# Patient Record
Sex: MALE | Race: WHITE | NOT HISPANIC OR LATINO | Employment: FULL TIME | URBAN - METROPOLITAN AREA
[De-identification: names, ages, dates, MRNs, and addresses within clinical notes are randomized per-mention and may not be internally consistent; named-entity substitution may affect disease eponyms.]

---

## 2018-01-04 ENCOUNTER — OFFICE VISIT (OUTPATIENT)
Dept: FAMILY MEDICINE | Facility: CLINIC | Age: 28
End: 2018-01-04
Payer: COMMERCIAL

## 2018-01-04 VITALS
HEART RATE: 77 BPM | TEMPERATURE: 98 F | HEIGHT: 73 IN | WEIGHT: 186.2 LBS | BODY MASS INDEX: 24.68 KG/M2 | OXYGEN SATURATION: 97 % | SYSTOLIC BLOOD PRESSURE: 114 MMHG | DIASTOLIC BLOOD PRESSURE: 74 MMHG | RESPIRATION RATE: 16 BRPM

## 2018-01-04 DIAGNOSIS — F41.0 PANIC ATTACK: ICD-10-CM

## 2018-01-04 DIAGNOSIS — Z23 NEED FOR VACCINATION: ICD-10-CM

## 2018-01-04 DIAGNOSIS — Z23 NEED FOR PROPHYLACTIC VACCINATION AND INOCULATION AGAINST INFLUENZA: ICD-10-CM

## 2018-01-04 DIAGNOSIS — F41.1 GAD (GENERALIZED ANXIETY DISORDER): Primary | ICD-10-CM

## 2018-01-04 LAB
HGB BLD-MCNC: 16 G/DL (ref 13.3–17.7)
TSH SERPL DL<=0.005 MIU/L-ACNC: 1.63 MU/L (ref 0.4–4)

## 2018-01-04 PROCEDURE — 99203 OFFICE O/P NEW LOW 30 MIN: CPT | Mod: 25 | Performed by: FAMILY MEDICINE

## 2018-01-04 PROCEDURE — 90472 IMMUNIZATION ADMIN EACH ADD: CPT | Performed by: FAMILY MEDICINE

## 2018-01-04 PROCEDURE — 84443 ASSAY THYROID STIM HORMONE: CPT | Performed by: FAMILY MEDICINE

## 2018-01-04 PROCEDURE — 90632 HEPA VACCINE ADULT IM: CPT | Performed by: FAMILY MEDICINE

## 2018-01-04 PROCEDURE — 90715 TDAP VACCINE 7 YRS/> IM: CPT | Performed by: FAMILY MEDICINE

## 2018-01-04 PROCEDURE — 36415 COLL VENOUS BLD VENIPUNCTURE: CPT | Performed by: FAMILY MEDICINE

## 2018-01-04 PROCEDURE — 90686 IIV4 VACC NO PRSV 0.5 ML IM: CPT | Performed by: FAMILY MEDICINE

## 2018-01-04 PROCEDURE — 90471 IMMUNIZATION ADMIN: CPT | Performed by: FAMILY MEDICINE

## 2018-01-04 PROCEDURE — 85018 HEMOGLOBIN: CPT | Performed by: FAMILY MEDICINE

## 2018-01-04 RX ORDER — HYDROXYZINE HYDROCHLORIDE 25 MG/1
25-50 TABLET, FILM COATED ORAL EVERY 6 HOURS PRN
Qty: 60 TABLET | Refills: 1 | Status: SHIPPED | OUTPATIENT
Start: 2018-01-04 | End: 2018-02-05

## 2018-01-04 ASSESSMENT — ANXIETY QUESTIONNAIRES
GAD7 TOTAL SCORE: 17
IF YOU CHECKED OFF ANY PROBLEMS ON THIS QUESTIONNAIRE, HOW DIFFICULT HAVE THESE PROBLEMS MADE IT FOR YOU TO DO YOUR WORK, TAKE CARE OF THINGS AT HOME, OR GET ALONG WITH OTHER PEOPLE: SOMEWHAT DIFFICULT
5. BEING SO RESTLESS THAT IT IS HARD TO SIT STILL: NEARLY EVERY DAY
7. FEELING AFRAID AS IF SOMETHING AWFUL MIGHT HAPPEN: MORE THAN HALF THE DAYS
6. BECOMING EASILY ANNOYED OR IRRITABLE: NEARLY EVERY DAY
1. FEELING NERVOUS, ANXIOUS, OR ON EDGE: MORE THAN HALF THE DAYS
2. NOT BEING ABLE TO STOP OR CONTROL WORRYING: NEARLY EVERY DAY
3. WORRYING TOO MUCH ABOUT DIFFERENT THINGS: NEARLY EVERY DAY

## 2018-01-04 ASSESSMENT — PAIN SCALES - GENERAL: PAINLEVEL: NO PAIN (0)

## 2018-01-04 ASSESSMENT — PATIENT HEALTH QUESTIONNAIRE - PHQ9: 5. POOR APPETITE OR OVEREATING: SEVERAL DAYS

## 2018-01-04 NOTE — MR AVS SNAPSHOT
After Visit Summary   1/4/2018    Burton Pillai    MRN: [id number]           Patient Information     Date Of Birth          1990        Visit Information        Provider Department      1/4/2018 2:45 PM Marky Owens MD [facility]        Today's Diagnoses     ELSA (generalized anxiety disorder)    -  1    Panic attack        Need for prophylactic vaccination and inoculation against influenza        Need for vaccination           Follow-ups after your visit        Additional Services     MENTAL HEALTH REFERRAL  - Adult; Outpatient Treatment; Individual/Couples/Family/Group Therapy/Health Psychology; G: [facility] [phone]; We will contact you to schedule the appointment or please call with any questions       All scheduling is subject to the client's specific insurance plan & benefits, provider/location availability, and provider clinical specialities.  Please arrive 15 minutes early for your first appointment and bring your completed paperwork.    Please be aware that coverage of these services is subject to the terms and limitations of your health insurance plan.  Call member services at your health plan with any benefit or coverage questions.                            Your next 10 appointments already scheduled     Feb 05, 2018  2:15 PM CST   Office Visit with Marky Owens MD   [facility] ([facility])    [address] [place] [id number] [phone]           Bring a current list of meds and any records pertaining to this visit. For Physicals, please bring immunization records and any forms needing to be filled out. Please arrive 10 minutes early to complete paperwork.              Who to contact     If you have questions or need follow up information about today's clinic visit or your schedule please contact [facility] directly at [phone].  Normal or non-critical  "lab and imaging results will be communicated to you by SwitchForcehart, letter or phone within 4 business days after the clinic has received the results. If you do not hear from us within 7 days, please contact the clinic through Avenue [employer] or phone. If you have a critical or abnormal lab result, we will notify you by phone as soon as possible.  Submit refill requests through [employer] or call your pharmacy and they will forward the refill request to us. Please allow 3 business days for your refill to be completed.          Additional Information About Your Visit        SwitchForceharSpotzot Information     [employer] gives you secure access to your electronic health record. If you see a primary care provider, you can also send messages to your care team and make appointments. If you have questions, please call your primary care clinic.  If you do not have a primary care provider, please call [phone] and they will assist you.        Care EveryWhere ID     This is your Care EveryWhere ID. This could be used by other organizations to access your [place] medical records  [id number]        Your Vitals Were     Pulse Temperature Respirations Height Pulse Oximetry BMI (Body Mass Index)    77 98  F (36.7  C) (Temporal) 16 1.847 m (6' 0.7\") 97% 24.77 kg/m2       Blood Pressure from Last 3 Encounters:   01/04/18 114/74   08/05/16 130/82   04/07/15 117/71    Weight from Last 3 Encounters:   01/04/18 84.5 kg (186 lb 3.2 oz)   08/05/16 80.7 kg (177 lb 14.4 oz)   03/01/14 70.3 kg (155 lb)              We Performed the Following     FLU VAC, SPLIT VIRUS IM > 3 YO (QUADRIVALENT) [75594]     Hemoglobin     HEPATITIS A VACCINE, ADULT  [00679]     MENTAL HEALTH REFERRAL  - Adult; Outpatient Treatment; Individual/Couples/Family/Group Therapy/Health Psychology; [facility] [phone]; We will contact you to schedule the appointment or please call with any questions     TDAP VACCINE (ADACEL) [76312.002]     TSH with free T4 reflex  "    Vaccine Administration, Each Additional [04316]     Vaccine Administration, Initial [84172]          Today's Medication Changes          These changes are accurate as of: 1/4/18  5:43 PM.  If you have any questions, ask your nurse or doctor.               Start taking these medicines.        Dose/Directions    hydrOXYzine 25 MG tablet   Commonly known as:  ATARAX   Used for:  ELSA (generalized anxiety disorder), Panic attack   Started by:  Marky Owens MD        Dose:  25-50 mg   Take 1-2 tablets (25-50 mg) by mouth every 6 hours as needed for anxiety   Quantity:  60 tablet   Refills:  1       sertraline 50 MG tablet   Commonly known as:  ZOLOFT   Used for:  ELSA (generalized anxiety disorder), Panic attack   Started by:  Marky Owens MD        Dose:  50 mg   Take 1 tablet (50 mg) by mouth daily   Quantity:  30 tablet   Refills:  1            Where to get your medicines      These medications were sent to [facility] [address]   [address] [facility] [address]     Phone:  [phone]     hydrOXYzine 25 MG tablet    sertraline 50 MG tablet                Primary Care Provider Office Phone # Fax #    Marky Owens [phone]       [address]   [facility] [address]        Equal Access to Services     [facility] AH: Hadii nabor ku hadasho Soomaali, waaxda luqadaha, qaybta kaalmada adeegyada, waxay idiin haytamin baron good. So [facility] [phone].    ATENCIÓN: Si habla español, tiene a mcguire disposición servicios gratuitos de asistencia lingüística. Llame al [phone].    We comply with applicable federal civil rights laws and [place] laws. We do not discriminate on the basis of race, color, national origin, age, disability, sex, sexual orientation, or gender identity.            Thank you!     Thank you for choosing [facility]  for your care. Our goal is always to provide you with excellent care.  Hearing back from our patients is one way we can continue to improve our services. Please take a few minutes to complete the written survey that you may receive in the mail after your visit with us. Thank you!             Your Updated Medication List - Protect others around you: Learn how to safely use, store and throw away your medicines at www.disposemymeds.org.          This list is accurate as of: 1/4/18  5:43 PM.  Always use your most recent med list.                   Brand Name Dispense Instructions for use Diagnosis    hydrOXYzine 25 MG tablet    ATARAX    60 tablet    Take 1-2 tablets (25-50 mg) by mouth every 6 hours as needed for anxiety    ELSA (generalized anxiety disorder), Panic attack       sertraline 50 MG tablet    ZOLOFT    30 tablet    Take 1 tablet (50 mg) by mouth daily    ELSA (generalized anxiety disorder), Panic attack

## 2018-01-04 NOTE — NURSING NOTE
Screening Questionnaire for Adult Immunization    Are you sick today?   No   Do you have allergies to medications, food, a vaccine component or latex?   No   Have you ever had a serious reaction after receiving a vaccination?   No   Do you have a long-term health problem with heart disease, lung disease, asthma, kidney disease, metabolic disease (e.g. diabetes), anemia, or other blood disorder?   No   Do you have cancer, leukemia, HIV/AIDS, or any other immune system problem?   No   In the past 3 months, have you taken medications that affect  your immune system, such as prednisone, other steroids, or anticancer drugs; drugs for the treatment of rheumatoid arthritis, Crohn s disease, or psoriasis; or have you had radiation treatments?   No   Have you had a seizure, or a brain or other nervous system problem?   No   During the past year, have you received a transfusion of blood or blood     products, or been given immune (gamma) globulin or antiviral drug?   No   For women: Are you pregnant or is there a chance you could become        pregnant during the next month?   No   Have you received any vaccinations in the past 4 weeks?   No     Immunization questionnaire answers were all negative.           Patient instructed to remain in clinic for 15 minutes afterwards, and to report any adverse reaction to me immediately.       Screening performed by Nikkie Chatterjee on 1/4/2018 at 3:57 PM.

## 2018-01-04 NOTE — PROGRESS NOTES
Burton,  Your results are normal.  Please let me know if you have any questions.    Sincerely,  Dr. Owens

## 2018-01-04 NOTE — PROGRESS NOTES
SUBJECTIVE:   Burton Pillai is a 27 year old male who presents to clinic today for the following health issues:        Abnormal Mood Symptoms  Onset: 2 years ago    Description:   Depression: no  Anxiety: YES    Accompanying Signs & Symptoms:  Still participating in activities that you used to enjoy: YES    Fatigue: YES  Irritability: YES  Difficulty concentrating: YES  Changes in appetite: no  Problems with sleep: no  Heart racing/beating fast : YES  Thoughts of hurting yourself or others: none    History:   Recent stress: YES  Prior depression hospitalization: None  Family history of depression: YES  Family history of anxiety: YES    Precipitating factors:   Alcohol/drug use: YES- little alcohol 1 to 2 drinks per week    Alleviating factors:  Nothing    ELSA-7 SCORE 1/4/2018   Total Score 17         Therapies Tried and outcome: None          Problem list and histories reviewed & adjusted, as indicated.  Additional history: as documented        Reviewed and updated as needed this visit by clinical staffTobacco  Allergies  Meds  Problems  Med Hx  Surg Hx  Fam Hx  Soc Hx        Reviewed and updated as needed this visit by Provider  Allergies  Meds  Problems         Patient here to discuss anxiety symptoms as noted above.  The past month or 2 he has been having full onset panic attacks almost daily.  No previous history of depression or anxiety for which he has been treated.  His fiancée has anxiety and is on Lexapro and she has recommended that he come in for evaluation as well.  He is otherwise healthy and on no medications.  He has had times where his panic attacks have prevented him from attending parties with friends.  He has had some symptoms at work but is able to continue to do his job.  He finds it difficult at times to leave the house.    Social History   Substance Use Topics     Smoking status: Passive Smoke Exposure - Never Smoker     Smokeless tobacco: Never Used      Comment: parents      "Alcohol use 3.0 oz/week     6 Cans of beer per week        ROS:  10 point ROS of systems including Constitutional, Eyes, HENT, Respiratory, Cardiovascular, Gastroenterology, Genitourinary, Integumentary, Muscularskeletal, Psychiatric were all negative except for pertinent positives noted in my HPI.     OBJECTIVE:   /74 (BP Location: Left arm, Patient Position: Chair, Cuff Size: Adult Large)  Pulse 77  Temp 98  F (36.7  C) (Temporal)  Resp 16  Ht 6' 0.7\" (1.847 m)  Wt 186 lb 3.2 oz (84.5 kg)  SpO2 97%  BMI 24.77 kg/m2  Body mass index is 24.77 kg/(m^2).  Physical Exam   Constitutional: He appears well-developed and well-nourished.   Neck: No thyroid mass and no thyromegaly present.   Cardiovascular: Normal rate, regular rhythm, S1 normal, S2 normal and normal heart sounds.    No murmur heard.  Pulmonary/Chest: Effort normal and breath sounds normal. No respiratory distress. He has no wheezes. He has no rhonchi. He has no rales.   Neurological: He is alert.   Psychiatric: His speech is normal and behavior is normal. Judgment and thought content normal. His mood appears anxious. His affect is not angry. Cognition and memory are normal. He exhibits a depressed mood (Flat affect).       ASSESSMENT/PLAN:       ICD-10-CM    1. ELSA (generalized anxiety disorder) F41.1 hydrOXYzine (ATARAX) 25 MG tablet     sertraline (ZOLOFT) 50 MG tablet     TSH with free T4 reflex     Hemoglobin   2. Panic attack F41.0 hydrOXYzine (ATARAX) 25 MG tablet     sertraline (ZOLOFT) 50 MG tablet     TSH with free T4 reflex     Hemoglobin   3. Need for prophylactic vaccination and inoculation against influenza Z23 FLU VAC, SPLIT VIRUS IM > 3 YO (QUADRIVALENT) [25170]     Vaccine Administration, Initial [87402]     Vaccine Administration, Each Additional [00525]     TDAP VACCINE (ADACEL) [65376.002]     HEPATITIS A VACCINE, ADULT  [07534]   4. Need for vaccination Z23      PLAN:  1.  We discussed treatment for anxiety both with " medications and with therapy.  Patient is interested in therapy so we will place order for [facility] [facility] here in [place].  I will start him on Zoloft 50 mg daily.  We discussed risks/benefits of the medication and he had no further questions.  If he notices any side effects with the Zoloft he can contact me via [employer] and we will adjust his dose.  He would like something for his daily panic attacks, so we will start him on hydroxyzine 25-50 mg every 6 hours as needed panic attacks.  I did warn him that this medication can make him drowsy.  2.  Will check TSH and hemoglobin for physical causes of anxiety.  3.  Vaccines as noted above.    Follow up with Provider -1 month for recheck on the Zoloft medication.    I spent > 35 minutes of face to face time with the patient, >50% of which was spent counseling and coordination of care regarding anxiety management.     Marky Owens MD   [facility]     Injectable Influenza Immunization Documentation    1.  Is the person to be vaccinated sick today?   No    2. Does the person to be vaccinated have an allergy to a component   of the vaccine?   No  Egg Allergy Algorithm Link    3. Has the person to be vaccinated ever had a serious reaction   to influenza vaccine in the past?   No    4. Has the person to be vaccinated ever had Guillain-Barré syndrome?   No    Form completed by Nikkie Chatterjee MA 1/4/2018  3:59 PM

## 2018-01-04 NOTE — NURSING NOTE
"Chief Complaint   Patient presents with     Anxiety       Initial /74 (BP Location: Left arm, Patient Position: Chair, Cuff Size: Adult Large)  Pulse 77  Temp 98  F (36.7  C) (Temporal)  Resp 16  Ht 6' 0.7\" (1.847 m)  Wt 186 lb 3.2 oz (84.5 kg)  SpO2 97%  BMI 24.77 kg/m2 Estimated body mass index is 24.77 kg/(m^2) as calculated from the following:    Height as of this encounter: 6' 0.7\" (1.847 m).    Weight as of this encounter: 186 lb 3.2 oz (84.5 kg).  Medication Reconciliation: complete     Nikkie Chatterjee MA 1/4/2018  2:52 PM          "

## 2018-01-04 NOTE — NURSING NOTE
Prior to injection verified patient identity using patient's name and date of birth.      Nikkie Chatterjee MA 1/4/2018  4:19 PM

## 2018-01-05 ASSESSMENT — ANXIETY QUESTIONNAIRES: GAD7 TOTAL SCORE: 17

## 2018-02-05 ENCOUNTER — OFFICE VISIT (OUTPATIENT)
Dept: FAMILY MEDICINE | Facility: CLINIC | Age: 28
End: 2018-02-05
Payer: COMMERCIAL

## 2018-02-05 VITALS
RESPIRATION RATE: 20 BRPM | TEMPERATURE: 98.3 F | BODY MASS INDEX: 25.33 KG/M2 | DIASTOLIC BLOOD PRESSURE: 64 MMHG | HEIGHT: 72 IN | WEIGHT: 187 LBS | SYSTOLIC BLOOD PRESSURE: 110 MMHG | HEART RATE: 78 BPM | OXYGEN SATURATION: 100 %

## 2018-02-05 DIAGNOSIS — F41.1 GAD (GENERALIZED ANXIETY DISORDER): ICD-10-CM

## 2018-02-05 DIAGNOSIS — F41.0 PANIC ATTACK: ICD-10-CM

## 2018-02-05 PROCEDURE — 99214 OFFICE O/P EST MOD 30 MIN: CPT | Performed by: FAMILY MEDICINE

## 2018-02-05 RX ORDER — HYDROXYZINE HYDROCHLORIDE 25 MG/1
25-50 TABLET, FILM COATED ORAL EVERY 6 HOURS PRN
Qty: 60 TABLET | Refills: 1 | Status: SHIPPED | OUTPATIENT
Start: 2018-02-05 | End: 2019-12-20

## 2018-02-05 RX ORDER — SERTRALINE HYDROCHLORIDE 100 MG/1
100 TABLET, FILM COATED ORAL DAILY
Qty: 30 TABLET | Refills: 1 | Status: SHIPPED | OUTPATIENT
Start: 2018-02-05 | End: 2019-12-20

## 2018-02-05 ASSESSMENT — ANXIETY QUESTIONNAIRES
5. BEING SO RESTLESS THAT IT IS HARD TO SIT STILL: NEARLY EVERY DAY
GAD7 TOTAL SCORE: 14
IF YOU CHECKED OFF ANY PROBLEMS ON THIS QUESTIONNAIRE, HOW DIFFICULT HAVE THESE PROBLEMS MADE IT FOR YOU TO DO YOUR WORK, TAKE CARE OF THINGS AT HOME, OR GET ALONG WITH OTHER PEOPLE: NOT DIFFICULT AT ALL
7. FEELING AFRAID AS IF SOMETHING AWFUL MIGHT HAPPEN: MORE THAN HALF THE DAYS
3. WORRYING TOO MUCH ABOUT DIFFERENT THINGS: SEVERAL DAYS
6. BECOMING EASILY ANNOYED OR IRRITABLE: MORE THAN HALF THE DAYS
1. FEELING NERVOUS, ANXIOUS, OR ON EDGE: MORE THAN HALF THE DAYS
2. NOT BEING ABLE TO STOP OR CONTROL WORRYING: MORE THAN HALF THE DAYS

## 2018-02-05 ASSESSMENT — PAIN SCALES - GENERAL: PAINLEVEL: NO PAIN (0)

## 2018-02-05 ASSESSMENT — PATIENT HEALTH QUESTIONNAIRE - PHQ9: 5. POOR APPETITE OR OVEREATING: MORE THAN HALF THE DAYS

## 2018-02-05 NOTE — PROGRESS NOTES
SUBJECTIVE:   Burton Pillai is a 27 year old male who presents to clinic today for the following health issues:      Anxiety Follow-Up    Status since last visit: Improved     Other associated symptoms:None    Complicating factors:   Significant life event: No   Current substance abuse: None  Depression symptoms: No  ELSA-7 SCORE 1/4/2018 2/5/2018   Total Score 17 14       ELSA-7    Amount of exercise or physical activity: 2-3 days/week for an average of 30-45 minutes    Problems taking medications regularly: No    Medication side effects: none    Diet: regular (no restrictions)            Problem list and histories reviewed & adjusted, as indicated.  Additional history: as documented        Reviewed and updated as needed this visit by clinical staff  Tobacco  Allergies  Meds  Problems  Soc Hx      Reviewed and updated as needed this visit by Provider  Allergies  Meds  Problems         Patient is here today to review his general anxiety disorder management and to discuss the medication used for this.  He states that since starting the Zoloft at the 50 mg dose 1 month ago, he is about 25% improved with his anxiety management.  He does note a significant improvement since being on the medication.  He is using the hydroxyzine about 1-2 times a day for management of anxiety attacks.  These attacks are not as severe as they were prior to being on the Zoloft.    He notes no medication side effects.    ROS:  10 point ROS of systems including Constitutional, Eyes, HENT, Respiratory, Cardiovascular, Gastroenterology, Genitourinary, Integumentary, Muscularskeletal, Psychiatric were all negative except for pertinent positives noted in my HPI.     OBJECTIVE:   /64  Pulse 78  Temp 98.3  F (36.8  C) (Temporal)  Resp 20  Ht 6' (1.829 m)  Wt 187 lb (84.8 kg)  SpO2 100%  BMI 25.36 kg/m2  Body mass index is 25.36 kg/(m^2).  Physical Exam   Constitutional: He appears well-developed and well-nourished.    Cardiovascular: Normal rate, regular rhythm, S1 normal, S2 normal and normal heart sounds.    No murmur heard.  Pulmonary/Chest: Effort normal and breath sounds normal. No respiratory distress. He has no wheezes. He has no rhonchi. He has no rales.   Neurological: He is alert.   Psychiatric: He has a normal mood and affect. His behavior is normal. Judgment and thought content normal.       ASSESSMENT/PLAN:       ICD-10-CM    1. ELSA (generalized anxiety disorder) F41.1 sertraline (ZOLOFT) 100 MG tablet     hydrOXYzine (ATARAX) 25 MG tablet   2. Panic attack F41.0 sertraline (ZOLOFT) 100 MG tablet     hydrOXYzine (ATARAX) 25 MG tablet     PLAN:  1.  Patient is improving with his anxiety disorder as well as the frequency and severity of his panic attacks.  However, there is room for improvement and we will increase his Zoloft dose from 50 mg daily to 100 mg daily.  We discussed that the goal ultimately will be for him to be at 90% or above in his improvement for his anxiety and to infrequently need to use hydroxyzine.    Follow up with Provider -4-6 weeks for review of his Zoloft dose increased.  We discussed that he can do this with an in person office visit, or he can do this via telephone visit or E visit with Katerin.    Marky Owens MD   [facility]

## 2018-02-05 NOTE — MR AVS SNAPSHOT
After Visit Summary   2/5/2018    Burton Pillai    MRN: [id number]           Patient Information     Date Of Birth          1990        Visit Information        Provider Department      2/5/2018 2:15 PM Marky Owens MD [facility]        Today's Diagnoses     ELSA (generalized anxiety disorder)        Panic attack           Follow-ups after your visit        Who to contact     If you have questions or need follow up information about today's clinic visit or your schedule please contact [facility] directly at [phone].  Normal or non-critical lab and imaging results will be communicated to you by Premier Biomedicalhart, letter or phone within 4 business days after the clinic has received the results. If you do not hear from us within 7 days, please contact the clinic through Populrt or phone. If you have a critical or abnormal lab result, we will notify you by phone as soon as possible.  Submit refill requests through Exim[employer]-Trianz or call your pharmacy and they will forward the refill request to us. Please allow 3 business days for your refill to be completed.          Additional Information About Your Visit        MyChart Information     [employer] gives you secure access to your electronic health record. If you see a primary care provider, you can also send messages to your care team and make appointments. If you have questions, please call your primary care clinic.  If you do not have a primary care provider, please call [phone] and they will assist you.        Care EveryWhere ID     This is your Care EveryWhere ID. This could be used by other organizations to access your [place] medical records  [id number]        Your Vitals Were     Pulse Temperature Respirations Height Pulse Oximetry BMI (Body Mass Index)    78 98.3  F (36.8  C) (Temporal) 20 6' (1.829 m) 100% 25.36 kg/m2       Blood Pressure from Last 3 Encounters:   02/05/18 110/64   01/04/18 114/74    08/05/16 130/82    Weight from Last 3 Encounters:   02/05/18 187 lb (84.8 kg)   01/04/18 186 lb 3.2 oz (84.5 kg)   08/05/16 177 lb 14.4 oz (80.7 kg)              Today, you had the following     No orders found for display         Today's Medication Changes          These changes are accurate as of 2/5/18 11:59 PM.  If you have any questions, ask your nurse or doctor.               These medicines have changed or have updated prescriptions.        Dose/Directions    sertraline 100 MG tablet   Commonly known as:  ZOLOFT   This may have changed:    - medication strength  - how much to take   Used for:  ELSA (generalized anxiety disorder), Panic attack   Changed by:  Marky Owens MD        Dose:  100 mg   Take 1 tablet (100 mg) by mouth daily   Quantity:  30 tablet   Refills:  1            Where to get your medicines      These medications were sent to [facility], [place] - [address]   [address] [facility] [address]     Phone:  [phone]     hydrOXYzine 25 MG tablet    sertraline 100 MG tablet                Primary Care Provider Office Phone # Fax #    Marky Owens [phone]       [address] [facility]   [facility] [address]        Equal Access to Services     MAXIM MEADOWS AH: Hadii aad ku hadasho Soomaali, waaxda luqadaha, qaybta kaalmada adeegyada, waxay idiin haytamin baron good. So [facility] [phone].    ATENCIÓN: Si habla español, tiene a mcguire disposición servicios gratuitos de asistencia lingüística. Llame al [phone].    We comply with applicable federal civil rights laws and [place] laws. We do not discriminate on the basis of race, color, national origin, age, disability, sex, sexual orientation, or gender identity.            Thank you!     Thank you for choosing [facility]  for your care. Our goal is always to provide you with excellent care. Hearing back from our patients is one way we can continue to improve  our services. Please take a few minutes to complete the written survey that you may receive in the mail after your visit with us. Thank you!             Your Updated Medication List - Protect others around you: Learn how to safely use, store and throw away your medicines at www.disposemymeds.org.          This list is accurate as of 2/5/18 11:59 PM.  Always use your most recent med list.                   Brand Name Dispense Instructions for use Diagnosis    hydrOXYzine 25 MG tablet    ATARAX    60 tablet    Take 1-2 tablets (25-50 mg) by mouth every 6 hours as needed for anxiety    ELSA (generalized anxiety disorder), Panic attack       sertraline 100 MG tablet    ZOLOFT    30 tablet    Take 1 tablet (100 mg) by mouth daily    ELSA (generalized anxiety disorder), Panic attack

## 2018-02-05 NOTE — NURSING NOTE
Chief Complaint   Patient presents with     Anxiety     1 month recheck       Initial /64  Pulse 78  Temp 98.3  F (36.8  C) (Temporal)  Resp 20  Ht 6' (1.829 m)  Wt 187 lb (84.8 kg)  SpO2 100%  BMI 25.36 kg/m2 Estimated body mass index is 25.36 kg/(m^2) as calculated from the following:    Height as of this encounter: 6' (1.829 m).    Weight as of this encounter: 187 lb (84.8 kg).  Medication Reconciliation: complete

## 2018-02-06 ASSESSMENT — PATIENT HEALTH QUESTIONNAIRE - PHQ9: SUM OF ALL RESPONSES TO PHQ QUESTIONS 1-9: 12

## 2018-02-06 ASSESSMENT — ANXIETY QUESTIONNAIRES: GAD7 TOTAL SCORE: 14

## 2019-11-08 ENCOUNTER — HEALTH MAINTENANCE LETTER (OUTPATIENT)
Age: 29
End: 2019-11-08

## 2019-12-19 ASSESSMENT — PATIENT HEALTH QUESTIONNAIRE - PHQ9
SUM OF ALL RESPONSES TO PHQ QUESTIONS 1-9: 2
10. IF YOU CHECKED OFF ANY PROBLEMS, HOW DIFFICULT HAVE THESE PROBLEMS MADE IT FOR YOU TO DO YOUR WORK, TAKE CARE OF THINGS AT HOME, OR GET ALONG WITH OTHER PEOPLE: NOT DIFFICULT AT ALL
SUM OF ALL RESPONSES TO PHQ QUESTIONS 1-9: 2

## 2019-12-19 ASSESSMENT — ANXIETY QUESTIONNAIRES
6. BECOMING EASILY ANNOYED OR IRRITABLE: MORE THAN HALF THE DAYS
4. TROUBLE RELAXING: NOT AT ALL
7. FEELING AFRAID AS IF SOMETHING AWFUL MIGHT HAPPEN: SEVERAL DAYS
2. NOT BEING ABLE TO STOP OR CONTROL WORRYING: SEVERAL DAYS
7. FEELING AFRAID AS IF SOMETHING AWFUL MIGHT HAPPEN: SEVERAL DAYS
5. BEING SO RESTLESS THAT IT IS HARD TO SIT STILL: SEVERAL DAYS
1. FEELING NERVOUS, ANXIOUS, OR ON EDGE: MORE THAN HALF THE DAYS
GAD7 TOTAL SCORE: 8
GAD7 TOTAL SCORE: 8
3. WORRYING TOO MUCH ABOUT DIFFERENT THINGS: SEVERAL DAYS

## 2019-12-20 ENCOUNTER — ANTICOAGULATION THERAPY VISIT (OUTPATIENT)
Dept: ANTICOAGULATION | Facility: OTHER | Age: 29
End: 2019-12-20

## 2019-12-20 ENCOUNTER — TELEPHONE (OUTPATIENT)
Dept: FAMILY MEDICINE | Facility: OTHER | Age: 29
End: 2019-12-20

## 2019-12-20 ENCOUNTER — OFFICE VISIT (OUTPATIENT)
Dept: FAMILY MEDICINE | Facility: OTHER | Age: 29
End: 2019-12-20
Payer: MEDICAID

## 2019-12-20 VITALS
BODY MASS INDEX: 25.9 KG/M2 | OXYGEN SATURATION: 96 % | WEIGHT: 191 LBS | RESPIRATION RATE: 16 BRPM | SYSTOLIC BLOOD PRESSURE: 122 MMHG | DIASTOLIC BLOOD PRESSURE: 78 MMHG | TEMPERATURE: 97.6 F | HEART RATE: 90 BPM

## 2019-12-20 DIAGNOSIS — S72.322D CLOSED DISPLACED TRANSVERSE FRACTURE OF SHAFT OF LEFT FEMUR WITH ROUTINE HEALING, SUBSEQUENT ENCOUNTER: ICD-10-CM

## 2019-12-20 DIAGNOSIS — I26.99 OTHER ACUTE PULMONARY EMBOLISM WITHOUT ACUTE COR PULMONALE (H): Primary | ICD-10-CM

## 2019-12-20 DIAGNOSIS — Z79.01 LONG TERM CURRENT USE OF ANTICOAGULANT THERAPY: Primary | ICD-10-CM

## 2019-12-20 DIAGNOSIS — I26.99 OTHER ACUTE PULMONARY EMBOLISM WITHOUT ACUTE COR PULMONALE (H): ICD-10-CM

## 2019-12-20 PROBLEM — S72.322A: Status: ACTIVE | Noted: 2019-12-14

## 2019-12-20 LAB
CAPILLARY BLOOD COLLECTION: NORMAL
INR BLD: 2.7 (ref 0.86–1.14)

## 2019-12-20 PROCEDURE — 99207 ZZC NO CHARGE NURSE ONLY: CPT | Performed by: FAMILY MEDICINE

## 2019-12-20 PROCEDURE — 85610 PROTHROMBIN TIME: CPT | Performed by: NURSE PRACTITIONER

## 2019-12-20 PROCEDURE — 85610 PROTHROMBIN TIME: CPT | Mod: QW | Performed by: NURSE PRACTITIONER

## 2019-12-20 PROCEDURE — 99214 OFFICE O/P EST MOD 30 MIN: CPT | Performed by: NURSE PRACTITIONER

## 2019-12-20 PROCEDURE — 36416 COLLJ CAPILLARY BLOOD SPEC: CPT | Performed by: NURSE PRACTITIONER

## 2019-12-20 RX ORDER — WARFARIN SODIUM 7.5 MG/1
7.5 TABLET ORAL
COMMUNITY
Start: 2019-12-18 | End: 2020-01-22

## 2019-12-20 RX ORDER — ASPIRIN 325 MG
325 TABLET ORAL
COMMUNITY
Start: 2019-12-15 | End: 2020-01-12

## 2019-12-20 RX ORDER — ACETAMINOPHEN 500 MG
1000 TABLET ORAL
COMMUNITY
Start: 2019-12-15 | End: 2020-09-24

## 2019-12-20 RX ORDER — OXYCODONE HYDROCHLORIDE 5 MG/1
5-10 TABLET ORAL
COMMUNITY
Start: 2019-12-15 | End: 2019-12-26

## 2019-12-20 RX ORDER — OXYCODONE HYDROCHLORIDE 5 MG/1
5 TABLET ORAL EVERY 6 HOURS PRN
Qty: 20 TABLET | Refills: 0 | Status: SHIPPED | OUTPATIENT
Start: 2019-12-20 | End: 2019-12-26

## 2019-12-20 RX ORDER — CYCLOBENZAPRINE HCL 5 MG
5 TABLET ORAL 3 TIMES DAILY PRN
Qty: 30 TABLET | Refills: 1 | Status: SHIPPED | OUTPATIENT
Start: 2019-12-20 | End: 2019-12-30

## 2019-12-20 RX ORDER — POLYETHYLENE GLYCOL 3350 17 G/17G
17 POWDER, FOR SOLUTION ORAL
COMMUNITY
Start: 2019-12-15 | End: 2020-09-24

## 2019-12-20 ASSESSMENT — PATIENT HEALTH QUESTIONNAIRE - PHQ9: SUM OF ALL RESPONSES TO PHQ QUESTIONS 1-9: 2

## 2019-12-20 ASSESSMENT — ANXIETY QUESTIONNAIRES: GAD7 TOTAL SCORE: 8

## 2019-12-20 NOTE — PROGRESS NOTES
Subjective     Burton Pillai is a 29 year old male who presents to clinic today for the following health issues:    HPI   Answers for HPI/ROS submitted by the patient on 12/19/2019   ELSA 7 TOTAL SCORE: 8  If you checked off any problems, how difficult have these problems made it for you to do your work, take care of things at home, or get along with other people?: Not difficult at all  PHQ9 TOTAL SCORE: 2         Hospital Follow-up Visit:    Hospital/Nursing Home/IP Rehab Facility: [facility]   Date of Admission: 12/14/19  Date of Discharge: 12/18/19  Reason(s) for Admission: Snowboarding accident, broken femur            Problems taking medications regularly:  None       Medication changes since discharge: None       Problems adhering to non-medication therapy:  None      Summary of hospitalization:  [facility] discharge summary reviewed  Diagnostic Tests/Treatments reviewed.  Follow up needed: none  Other Healthcare Providers Involved in Patient s Care:         None  Update since discharge: improved.       Post Discharge Medication Reconciliation: discharge medications reconciled, continue medications without change.  Plan of care communicated with patient and family     Coding guidelines for this visit:  Type of Medical   Decision Making Face-to-Face Visit       within 7 Days of discharge Face-to-Face Visit        within 14 days of discharge   Moderate Complexity [id number]   High Complexity [id number]          Patient discharge from [facility] for acute pe after having femoral duy placed due to closed displaced transverse fracture of shaft left femur.     Presents for follow up O2 sat stable at 96% he states at home he will be 97 % 1 L when he get up to use bathroom will drop to 90-91 % on room air.   INR goal 2-3 will check today (2.7) referral to coumadin clinic.   Warfarin 7.5 mg and Lovenox 90 mg subcutaneous if at goal on Mon can discharge Lovenox and continue coumadin RN clinic  "to manage dosing.   He is using a walker will be starting physical therapy in about 2 weeks. He is doing exercises and stretches that were given to him by PT in hospital he is post discharge day 3.   He is taking Oxycodone he is taking (2) 5 mg tabs every 4 hoursand tylenol at home he feels he is modestly well controlled for pain he states pain will go to 4 but usually will be about a 7-8 just before taking another dose of oxycodone.  Tylenol 1000 mg every 8 hours.   He states he is having pain \"jolts\" of pain feeling muscle spasm in left knee he states he was using flexeril in the hospital and this helped.     Denies constipation or bladder issues. Last BM was Wed this was normal he is miralax.           Patient Active Problem List   Diagnosis     Acne     Closed displaced transverse fracture of shaft of left femur (H)     Other pulmonary embolism without acute cor pulmonale (H)     Other acute pulmonary embolism without acute cor pulmonale (H)     History reviewed. No pertinent surgical history.    Social History     Tobacco Use     Smoking status: Passive Smoke Exposure - Never Smoker     Smokeless tobacco: Never Used     Tobacco comment: parents   Substance Use Topics     Alcohol use: Yes     Alcohol/week: 5.0 standard drinks     Types: 6 Cans of beer per week     Family History   Problem Relation Age of Onset     Diabetes Father      Hypertension Father      Heart Disease Father      Breast Cancer Maternal Grandmother      Lipids Maternal Grandmother      Thyroid Disease Paternal Grandmother          Current Outpatient Medications   Medication Sig Dispense Refill     acetaminophen (TYLENOL) 500 MG tablet Take 1,000 mg by mouth       aspirin (ASA) 325 MG tablet Take 325 mg by mouth       cyclobenzaprine (FLEXERIL) 5 MG tablet Take 1 tablet (5 mg) by mouth 3 times daily as needed for muscle spasms 30 tablet 1     enoxaparin ANTICOAGULANT (LOVENOX) 100 MG/ML syringe Inject 90 mg Subcutaneous every 12 hours       " oxyCODONE (ROXICODONE) 5 MG tablet Take 5-10 mg by mouth       oxyCODONE (ROXICODONE) 5 MG tablet Take 1 tablet (5 mg) by mouth every 6 hours as needed for moderate to severe pain or severe pain 20 tablet 0     polyethylene glycol (MIRALAX/GLYCOLAX) packet Take 17 g by mouth       warfarin ANTICOAGULANT (COUMADIN) 7.5 MG tablet Take 7.5 mg by mouth       Allergies   Allergen Reactions     No Known Drug Allergies      Recent Labs   Lab Test 01/04/18  1614 08/05/16  1335   ALT  --  19   CR  --  0.80   GFRESTIMATED  --  >90  Non  GFR Calc     GFRESTBLACK  --  >90   GFR Calc     POTASSIUM  --  3.9   TSH 1.63  --       BP Readings from Last 3 Encounters:   12/20/19 122/78   02/05/18 110/64   01/04/18 114/74    Wt Readings from Last 3 Encounters:   12/20/19 86.6 kg (191 lb)   02/05/18 84.8 kg (187 lb)   01/04/18 84.5 kg (186 lb 3.2 oz)         Reviewed and updated as needed this visit by Provider  Meds         Review of Systems   ROS COMP: Constitutional, HEENT, cardiovascular, pulmonary, GI, , musculoskeletal, neuro, skin, endocrine and psych systems are negative, except as otherwise noted.      Objective    /78   Pulse 90   Temp 97.6  F (36.4  C) (Temporal)   Resp 16   Wt 86.6 kg (191 lb)   SpO2 96%   BMI 25.90 kg/m    Body mass index is 25.9 kg/m .  Physical Exam   GENERAL: healthy, alert and no distress  EYES: Eyes grossly normal to inspection, PERRL and conjunctivae and sclerae normal  HENT: ear canals and TM's normal, nose and mouth without ulcers or lesions  NECK: no adenopathy, no asymmetry, masses, or scars and thyroid normal to palpation  RESP: lungs clear to auscultation - no rales, rhonchi or wheezes. He is on 1 L NC stating 96 %   CV: regular rate and rhythm, normal S1 S2, no S3 or S4, no murmur, click or rub, no peripheral edema and peripheral pulses strong  ABDOMEN: soft, nontender, no hepatosplenomegaly, no masses and bowel sounds normal  MS: patient  ambulating with walker.   SKIN: no suspicious lesions or rashes  NEURO: Normal strength and tone, mentation intact and speech normal  PSYCH: mentation appears normal, affect normal/bright    Results for orders placed or performed in visit on 12/20/19   Capillary Blood Collection     Status: None   Result Value Ref Range    Capillary Blood Collection Capillary collection performed    INR point of care     Status: Abnormal   Result Value Ref Range    INR Point of Care 2.7 (H) 0.86 - 1.14          Assessment & Plan     1. Other acute pulmonary embolism without acute cor pulmonale (H)  Stable see note above  - ANTICOAGULATION CLINIC REFERRAL  - Capillary Blood Collection  - INR point of care    2. Closed displaced transverse fracture of shaft of left femur with routine healing, subsequent encounter  Refill given for oxycodone due to pain from fracture and subsequent surgery. Will also give flexeril for spasms he is aware no driving.   Side effects of medications, proper use, the associated risk/benefits and other options were discussed. Patient understands these risks and agrees to take the medication as instructed.     - cyclobenzaprine (FLEXERIL) 5 MG tablet; Take 1 tablet (5 mg) by mouth 3 times daily as needed for muscle spasms  Dispense: 30 tablet; Refill: 1  - oxyCODONE (ROXICODONE) 5 MG tablet; Take 1 tablet (5 mg) by mouth every 6 hours as needed for moderate to severe pain or severe pain  Dispense: 20 tablet; Refill: 0     Return to clinic in 2-4 weeks.     KWAME Nichols [facility]

## 2019-12-20 NOTE — TELEPHONE ENCOUNTER
----- Message from KWAME Goldberg CNP sent at 2019  4:52 PM CST -----  Please advise Burton Pillai,  1990, that his lab results were INR is 2.7 this is at goal plan on recheck on Mon order is in epic for this.   [phone] (home)   Thank you  Claudia Mascorro CNP

## 2019-12-20 NOTE — RESULT ENCOUNTER NOTE
Please advise Burton Pillai,  1990, that his lab results were INR is 2.7 this is at goal plan on recheck on Mon order is in epic for this.   [phone] (home)   Thank you  Claudia Mascorro CNP

## 2019-12-20 NOTE — PROGRESS NOTES
ANTICOAGULATION FOLLOW-UP CLINIC VISIT    Patient Name:  Burton Pillai  Date:  2019  Contact Type:  Telephone    SUBJECTIVE:  Patient Findings     Comments:   New start. OK to stop the Lovenox. Will recheck INR on Monday and set him up with a consult on Friday next week in [place]. Heidy Matute RN, BSN          Clinical Outcomes     Comments:   New start. OK to stop the Lovenox. Will recheck INR on Monday and set him up with a consult on Friday next week in [place]. Heidy Matute RN, BSN             OBJECTIVE    INR Point of Care   Date Value Ref Range Status   2019 2.7 (H) 0.86 - 1.14 Final     Comment:     This test is intended for monitoring Coumadin therapy.  Results are not   accurate in patients with prolonged INR due to factor deficiency.         ASSESSMENT / PLAN  INR assessment THER    Recheck INR In: 3 DAYS    INR Location Outside lab      Anticoagulation Summary  As of 2019    INR goal:   2.0-3.0   TTR:   --   INR used for dosin.7 (2019)   Warfarin maintenance plan:   No maintenance plan   Full warfarin instructions:   : 7.5 mg; : 3.75 mg; : 7.5 mg; Otherwise No maintenance plan   Next INR check:   2019   Target end date:   2020    Indications    Other acute pulmonary embolism without acute cor pulmonale (H) [I26.99]             Anticoagulation Episode Summary     INR check location:       Preferred lab:       Send INR reminders to:   ODALIS TIDWELL    Comments:   7.5 mg tabs      Anticoagulation Care Providers     Provider Role Specialty Phone number    Subha Claudiaparrish Richter, KWAME GIBBS Referring Nurse Practitioner - Family [phone]            See the Encounter Report to view Anticoagulation Flowsheet and Dosing Calendar (Go to Encounters tab in chart review, and find the Anticoagulation Therapy Visit)    Dosage adjustment made based on physician directed care plan.    Heidy Matute RN

## 2019-12-23 ENCOUNTER — ANTICOAGULATION THERAPY VISIT (OUTPATIENT)
Dept: ANTICOAGULATION | Facility: OTHER | Age: 29
End: 2019-12-23

## 2019-12-23 DIAGNOSIS — Z79.01 LONG TERM CURRENT USE OF ANTICOAGULANT THERAPY: ICD-10-CM

## 2019-12-23 DIAGNOSIS — I26.99 OTHER ACUTE PULMONARY EMBOLISM WITHOUT ACUTE COR PULMONALE (H): ICD-10-CM

## 2019-12-23 LAB
CAPILLARY BLOOD COLLECTION: NORMAL
INR BLD: 1.8 (ref 0.86–1.14)

## 2019-12-23 PROCEDURE — 99207 ZZC NO CHARGE NURSE ONLY: CPT | Performed by: FAMILY MEDICINE

## 2019-12-23 PROCEDURE — 36416 COLLJ CAPILLARY BLOOD SPEC: CPT | Performed by: NURSE PRACTITIONER

## 2019-12-23 PROCEDURE — 85610 PROTHROMBIN TIME: CPT | Mod: QW | Performed by: NURSE PRACTITIONER

## 2019-12-23 NOTE — RESULT ENCOUNTER NOTE
Please advise Burton Pillai,  1990, that his lab results were INR 1.8 a little below goal please follow up with coumadin nurse on Thursday as scheduled.   [phone] (home)   Thank you  Claudia Mascorro CNP

## 2019-12-23 NOTE — PROGRESS NOTES
ANTICOAGULATION FOLLOW-UP CLINIC VISIT    Patient Name:  Burton Pillai  Date:  2019  Contact Type:  Telephone    SUBJECTIVE:  Patient Findings     Comments:   New start - Pt will restart his Lovenox as he does have more at home. He is scheduled for a consult on Thursday with Cherie in [facility].         Clinical Outcomes     Comments:   New start - Pt will restart his Lovenox as he does have more at home. He is scheduled for a consult on Thursday with Cherie in [facility].            OBJECTIVE    INR Point of Care   Date Value Ref Range Status   2019 1.8 (H) 0.86 - 1.14 Final     Comment:     This test is intended for monitoring Coumadin therapy.  Results are not   accurate in patients with prolonged INR due to factor deficiency.         ASSESSMENT / PLAN  INR assessment SUB    Recheck INR In: 3 DAYS    INR Location Outside lab      Anticoagulation Summary  As of 2019    INR goal:   2.0-3.0   TTR:   --   INR used for dosin.8! (2019)   Warfarin maintenance plan:   No maintenance plan   Full warfarin instructions:   : 11.25 mg; : 7.5 mg; : 7.5 mg; Otherwise No maintenance plan   Next INR check:   2019   Target end date:   2020    Indications    Other acute pulmonary embolism without acute cor pulmonale (H) [I26.99]             Anticoagulation Episode Summary     INR check location:       Preferred lab:       Send INR reminders to:   ODALIS TIDWELL    Comments:   7.5 mg tabs      Anticoagulation Care Providers     Provider Role Specialty Phone number    Claudia Mascorro APRN CNP Referring Nurse Practitioner - Family [phone]            See the Encounter Report to view Anticoagulation Flowsheet and Dosing Calendar (Go to Encounters tab in chart review, and find the Anticoagulation Therapy Visit)    Dosage adjustment made based on physician directed care plan.    Heidy Matute RN

## 2019-12-26 ENCOUNTER — ANTICOAGULATION THERAPY VISIT (OUTPATIENT)
Dept: ANTICOAGULATION | Facility: OTHER | Age: 29
End: 2019-12-26
Payer: MEDICAID

## 2019-12-26 ENCOUNTER — MYC MEDICAL ADVICE (OUTPATIENT)
Dept: FAMILY MEDICINE | Facility: OTHER | Age: 29
End: 2019-12-26

## 2019-12-26 DIAGNOSIS — I26.99 OTHER ACUTE PULMONARY EMBOLISM WITHOUT ACUTE COR PULMONALE (H): ICD-10-CM

## 2019-12-26 DIAGNOSIS — S72.322D CLOSED DISPLACED TRANSVERSE FRACTURE OF SHAFT OF LEFT FEMUR WITH ROUTINE HEALING, SUBSEQUENT ENCOUNTER: ICD-10-CM

## 2019-12-26 LAB — INR POINT OF CARE: 3.3 (ref 0.86–1.14)

## 2019-12-26 PROCEDURE — 85610 PROTHROMBIN TIME: CPT | Mod: QW

## 2019-12-26 PROCEDURE — 99207 ZZC NO CHARGE NURSE ONLY: CPT

## 2019-12-26 PROCEDURE — 36416 COLLJ CAPILLARY BLOOD SPEC: CPT

## 2019-12-26 RX ORDER — OXYCODONE HYDROCHLORIDE 5 MG/1
5 TABLET ORAL EVERY 6 HOURS PRN
Qty: 20 TABLET | Refills: 0 | Status: SHIPPED | OUTPATIENT
Start: 2019-12-26 | End: 2019-12-30

## 2019-12-26 NOTE — PROGRESS NOTES
ANTICOAGULATION INITIAL CLINIC VISIT    Patient Name:  Burton Pillai  Date:  12/26/2019  Referred by: Claudia Mascorro  Contact Type:  Face to Face    SUBJECTIVE:  Coumadin education was completed today.  Topics covered include:  -Introduction to coumadin  -Proper Administration  -INR Testing  -Sign/Symptoms of Bleeding  -Signs/Symptoms of Clot Formation or Stroke  -Dietary Intake of Vitamin K  -Drug Interactions  -Anticoagulation Identification (bracelet, necklace or wallet card)  -Future Surgery  -Effects of Alcohol, Tobacco, and Exercise on Coumadin    Coumadin Education Booklet and Coumadin Identification Wallet Card were given to the patient.    Patient Findings     Comments:   Consult, did not restart lovenox Mon.         Clinical Outcomes     Comments:   Consult, did not restart lovenox Mon.           OBJECTIVE    INR Protime   Date Value Ref Range Status   12/26/2019 3.3 (A) 0.86 - 1.14 Final       ASSESSMENT / PLAN  INR assessment SUPRA    Recheck INR In: 4 DAYS    INR Location Clinic      Anticoagulation Summary  As of 12/26/2019    INR goal:   2.0-3.0   TTR:   --   INR used for dosing:   3.3! (12/26/2019)   Warfarin maintenance plan:   7.5 mg (7.5 mg x 1) every day   Full warfarin instructions:   7.5 mg every day   Weekly warfarin total:   52.5 mg   Plan last modified:   Cherie Choi, RN (12/26/2019)   Next INR check:   12/30/2019   Target end date:   12/20/2020    Indications    Other acute pulmonary embolism without acute cor pulmonale (H) [I26.99]             Anticoagulation Episode Summary     INR check location:       Preferred lab:       Send INR reminders to:   ODALIS TIDWELL    Comments:   7.5 mg tabs      Anticoagulation Care Providers     Provider Role Specialty Phone number    Claudia Mascorro APRN CNP Referring Nurse Practitioner - Family [phone]            See the Encounter Report to view Anticoagulation Flowsheet and Dosing Calendar (Go to Encounters tab in chart  review, and find the Anticoagulation Therapy Visit)    Dosage adjustment made based on physician directed care plan.    Cherie Choi RN

## 2019-12-29 ENCOUNTER — MYC MEDICAL ADVICE (OUTPATIENT)
Dept: FAMILY MEDICINE | Facility: OTHER | Age: 29
End: 2019-12-29

## 2019-12-29 DIAGNOSIS — S72.322D CLOSED DISPLACED TRANSVERSE FRACTURE OF SHAFT OF LEFT FEMUR WITH ROUTINE HEALING, SUBSEQUENT ENCOUNTER: Primary | ICD-10-CM

## 2019-12-30 ENCOUNTER — ANTICOAGULATION THERAPY VISIT (OUTPATIENT)
Dept: ANTICOAGULATION | Facility: OTHER | Age: 29
End: 2019-12-30

## 2019-12-30 DIAGNOSIS — Z79.01 LONG TERM CURRENT USE OF ANTICOAGULANT THERAPY: ICD-10-CM

## 2019-12-30 DIAGNOSIS — I26.99 OTHER ACUTE PULMONARY EMBOLISM WITHOUT ACUTE COR PULMONALE (H): ICD-10-CM

## 2019-12-30 LAB
CAPILLARY BLOOD COLLECTION: NORMAL
INR BLD: 3 (ref 0.86–1.14)

## 2019-12-30 PROCEDURE — 85610 PROTHROMBIN TIME: CPT | Mod: QW | Performed by: FAMILY MEDICINE

## 2019-12-30 PROCEDURE — 99207 ZZC NO CHARGE NURSE ONLY: CPT | Performed by: FAMILY MEDICINE

## 2019-12-30 PROCEDURE — 36416 COLLJ CAPILLARY BLOOD SPEC: CPT | Performed by: FAMILY MEDICINE

## 2019-12-30 RX ORDER — AMOXICILLIN 250 MG
1 CAPSULE ORAL DAILY
Qty: 30 TABLET | Refills: 0 | Status: SHIPPED | OUTPATIENT
Start: 2019-12-30 | End: 2020-06-30

## 2019-12-30 NOTE — PROGRESS NOTES
ANTICOAGULATION FOLLOW-UP CLINIC VISIT    Patient Name:  Burton Pillai  Date:  12/30/2019  Contact Type:  Telephone    SUBJECTIVE:  Patient Findings     Comments:   The patient was assessed for diet, medication, and activity level changes, missed or extra doses, bruising or bleeding, with no problem findings.          Clinical Outcomes     Negatives:   Major bleeding event, Thromboembolic event, Anticoagulation-related hospital admission, Anticoagulation-related ED visit, Anticoagulation-related fatality    Comments:   The patient was assessed for diet, medication, and activity level changes, missed or extra doses, bruising or bleeding, with no problem findings.             OBJECTIVE    INR Point of Care   Date Value Ref Range Status   12/30/2019 3.0 (H) 0.86 - 1.14 Final     Comment:     This test is intended for monitoring Coumadin therapy.  Results are not   accurate in patients with prolonged INR due to factor deficiency.         ASSESSMENT / PLAN  INR assessment THER    Recheck INR In: 4 DAYS    INR Location Outside lab      Anticoagulation Summary  As of 12/30/2019    INR goal:   2.0-3.0   TTR:   --   INR used for dosing:   3.0 (12/30/2019)   Warfarin maintenance plan:   7.5 mg (7.5 mg x 1) every day   Full warfarin instructions:   7.5 mg every day   Weekly warfarin total:   52.5 mg   No change documented:   Heidy Matute RN   Plan last modified:   Cherie Choi, RN (12/26/2019)   Next INR check:   1/3/2020   Target end date:   12/20/2020    Indications    Other acute pulmonary embolism without acute cor pulmonale (H) [I26.99]             Anticoagulation Episode Summary     INR check location:       Preferred lab:       Send INR reminders to:   ODALSI TIDWELL    Comments:   7.5 mg tabs      Anticoagulation Care Providers     Provider Role Specialty Phone number    Claudia Mascorro APRN CNP Referring Nurse Practitioner - Family [phone]            See the Encounter Report to view  Anticoagulation Flowsheet and Dosing Calendar (Go to Encounters tab in chart review, and find the Anticoagulation Therapy Visit)    Dosage adjustment made based on physician directed care plan.    Heidy Matute RN

## 2019-12-30 NOTE — PROGRESS NOTES
Subjective     Burton Pillai is a 29 year old male who presents to clinic today for the following health issues:    HPI       F/u Snowboarding injuries   DOI: 12/14/19    Description:   Broken femur and pulmonary embolism while snowboarding    Increased left knee pain the last few days.     Hasn't felt the need to use oxygen since chon rodriguez. Wondering if he is in the clear to return the tank and be officially off oxygen     Patient states he is having pain during night while sleeping and first thing in am when he wakes up pain is ~ 7-8/10 he is taking about 2 tablets of oxycodone 5 mg per day.     He is no longer using lovenox and is taking coumadin 2.5 mg last INR 4.0 will recheck today.     He is up moving well with walker and crutches. He is able to WBAS and will be seeing PT   Follow with ortho on 2/3/20      Patient Active Problem List   Diagnosis     Acne     Closed displaced transverse fracture of shaft of left femur (H)     Other pulmonary embolism without acute cor pulmonale (H)     Other acute pulmonary embolism without acute cor pulmonale (H)     History reviewed. No pertinent surgical history.    Social History     Tobacco Use     Smoking status: Passive Smoke Exposure - Never Smoker     Smokeless tobacco: Never Used     Tobacco comment: parents   Substance Use Topics     Alcohol use: Yes     Alcohol/week: 5.0 standard drinks     Types: 6 Cans of beer per week     Family History   Problem Relation Age of Onset     Diabetes Father      Hypertension Father      Heart Disease Father      Breast Cancer Maternal Grandmother      Lipids Maternal Grandmother      Thyroid Disease Paternal Grandmother          Current Outpatient Medications   Medication Sig Dispense Refill     acetaminophen (TYLENOL) 500 MG tablet Take 1,000 mg by mouth       aspirin (ASA) 325 MG tablet Take 325 mg by mouth       cyclobenzaprine (FLEXERIL) 5 MG tablet Take 1 tablet (5 mg) by mouth 3 times daily as needed for muscle  spasms 30 tablet 1     oxyCODONE (ROXICODONE) 5 MG tablet Take 1 tablet (5 mg) by mouth every 6 hours as needed for moderate to severe pain or severe pain 20 tablet 0     polyethylene glycol (MIRALAX/GLYCOLAX) packet Take 17 g by mouth       senna-docusate (SENOKOT-S/PERICOLACE) 8.6-50 MG tablet Take 1 tablet by mouth daily 30 tablet 0     warfarin ANTICOAGULANT (COUMADIN) 2.5 MG tablet Take 7.5 mg daily (3 tabs) or as directed by coumadin clinic 90 tablet 0     warfarin ANTICOAGULANT (COUMADIN) 7.5 MG tablet Take 7.5 mg by mouth       Allergies   Allergen Reactions     No Known Drug Allergies      Recent Labs   Lab Test 01/04/18  1614 08/05/16  1335   ALT  --  19   CR  --  0.80   GFRESTIMATED  --  >90  Non  GFR Calc     GFRESTBLACK  --  >90   GFR Calc     POTASSIUM  --  3.9   TSH 1.63  --       BP Readings from Last 3 Encounters:   01/06/20 120/74   12/20/19 122/78   02/05/18 110/64    Wt Readings from Last 3 Encounters:   01/06/20 84.8 kg (187 lb)   12/20/19 86.6 kg (191 lb)   02/05/18 84.8 kg (187 lb)        Reviewed and updated as needed this visit by Provider    Review of Systems   ROS COMP: Constitutional, HEENT, cardiovascular, pulmonary, GI, , musculoskeletal, neuro, skin, endocrine and psych systems are negative, except as otherwise noted.      Objective    /74   Pulse 66   Temp 98.3  F (36.8  C) (Temporal)   Resp 16   Wt 84.8 kg (187 lb)   SpO2 98%   BMI 25.36 kg/m    Body mass index is 25.36 kg/m .  Physical Exam   GENERAL: healthy, alert and no distress  NECK: no adenopathy, no asymmetry, masses, or scars and thyroid normal to palpation  RESP: lungs clear to auscultation - no rales, rhonchi or wheezes  CV: regular rate and rhythm, normal S1 S2, no S3 or S4, no murmur, click or rub, no peripheral edema and peripheral pulses strong  ABDOMEN: soft, nontender, no hepatosplenomegaly, no masses and bowel sounds normal  MS: no gross musculoskeletal defects noted, no  edema    Diagnostic Test Results:  Labs reviewed in Epic        Assessment & Plan     1. Other acute pulmonary embolism without acute cor pulmonale (H)  Continue coumadin may discontinue home oxygen. Discussed likely will need to be on coumadin for 3-9 mos. Do not recommend further imaging also reviewed this plan with Dr. Lucas in clinic.     2. Closed displaced transverse fracture of shaft of left femur with routine healing, subsequent encounter  Continue to work with PT and cutting down on opioid as able.          Patient Instructions   Doing well continue treatment without change.   Continue Physical therapy and follow up with ortho.   I will look into length of oral coumadin and call with that information.    Plan on return to clinic in 4-6 weeks for pain and progress.         KWAME Nichols [facility]

## 2020-01-03 ENCOUNTER — ANTICOAGULATION THERAPY VISIT (OUTPATIENT)
Dept: ANTICOAGULATION | Facility: OTHER | Age: 30
End: 2020-01-03
Payer: MEDICAID

## 2020-01-03 DIAGNOSIS — I26.99 OTHER ACUTE PULMONARY EMBOLISM WITHOUT ACUTE COR PULMONALE (H): ICD-10-CM

## 2020-01-03 LAB — INR POINT OF CARE: 4 (ref 0.86–1.14)

## 2020-01-03 PROCEDURE — 36416 COLLJ CAPILLARY BLOOD SPEC: CPT

## 2020-01-03 PROCEDURE — 99207 ZZC NO CHARGE NURSE ONLY: CPT

## 2020-01-03 PROCEDURE — 85610 PROTHROMBIN TIME: CPT | Mod: QW

## 2020-01-03 RX ORDER — WARFARIN SODIUM 2.5 MG/1
TABLET ORAL
Qty: 90 TABLET | Refills: 0 | Status: SHIPPED | OUTPATIENT
Start: 2020-01-03 | End: 2020-01-31

## 2020-01-03 NOTE — PROGRESS NOTES
ANTICOAGULATION FOLLOW-UP CLINIC VISIT    Patient Name:  Burton Pillai  Date:  1/3/2020  Contact Type:  Face to Face    SUBJECTIVE:  Patient Findings     Comments:   Already took 7.5 today, will wait Monday to until his INR is checked.         Clinical Outcomes     Comments:   Already took 7.5 today, will wait Monday to until his INR is checked.            OBJECTIVE    INR Protime   Date Value Ref Range Status   2020 4.0 (A) 0.86 - 1.14 Final       ASSESSMENT / PLAN  INR assessment SUPRA    Recheck INR In: 3 DAYS    INR Location Clinic      Anticoagulation Summary  As of 1/3/2020    INR goal:   2.0-3.0   TTR:   0.0 % (4 d)   INR used for dosin.0! (1/3/2020)   Warfarin maintenance plan:   No maintenance plan   Full warfarin instructions:   1/3: 7.5 mg; : 2.5 mg; : 7.5 mg   Plan last modified:   Cherie Choi RN (1/3/2020)   Next INR check:   2020   Target end date:   2020    Indications    Other acute pulmonary embolism without acute cor pulmonale (H) [I26.99]             Anticoagulation Episode Summary     INR check location:       Preferred lab:       Send INR reminders to:   ODALIS TIDWELL    Comments:   7.5 mg and 2.5 mg tabs- will transition to 2.5 mg only, very little greens, AM dose      Anticoagulation Care Providers     Provider Role Specialty Phone number    Subha Claudia KWAME Rider CNP Referring Nurse Practitioner - Family [phone]            See the Encounter Report to view Anticoagulation Flowsheet and Dosing Calendar (Go to Encounters tab in chart review, and find the Anticoagulation Therapy Visit)    Dosage adjustment made based on physician directed care plan.    Cherie Choi RN

## 2020-01-06 ENCOUNTER — OFFICE VISIT (OUTPATIENT)
Dept: FAMILY MEDICINE | Facility: OTHER | Age: 30
End: 2020-01-06
Payer: MEDICAID

## 2020-01-06 ENCOUNTER — ANTICOAGULATION THERAPY VISIT (OUTPATIENT)
Dept: ANTICOAGULATION | Facility: OTHER | Age: 30
End: 2020-01-06
Payer: MEDICAID

## 2020-01-06 VITALS
WEIGHT: 187 LBS | OXYGEN SATURATION: 98 % | BODY MASS INDEX: 25.36 KG/M2 | RESPIRATION RATE: 16 BRPM | TEMPERATURE: 98.3 F | SYSTOLIC BLOOD PRESSURE: 120 MMHG | DIASTOLIC BLOOD PRESSURE: 74 MMHG | HEART RATE: 66 BPM

## 2020-01-06 DIAGNOSIS — S72.322D CLOSED DISPLACED TRANSVERSE FRACTURE OF SHAFT OF LEFT FEMUR WITH ROUTINE HEALING, SUBSEQUENT ENCOUNTER: ICD-10-CM

## 2020-01-06 DIAGNOSIS — I26.99 OTHER ACUTE PULMONARY EMBOLISM WITHOUT ACUTE COR PULMONALE (H): ICD-10-CM

## 2020-01-06 DIAGNOSIS — I26.99 OTHER ACUTE PULMONARY EMBOLISM WITHOUT ACUTE COR PULMONALE (H): Primary | ICD-10-CM

## 2020-01-06 DIAGNOSIS — Z79.01 LONG TERM CURRENT USE OF ANTICOAGULANT THERAPY: ICD-10-CM

## 2020-01-06 LAB — INR BLD: 3 (ref 0.86–1.14)

## 2020-01-06 PROCEDURE — 99207 ZZC NO CHARGE NURSE ONLY: CPT | Performed by: FAMILY MEDICINE

## 2020-01-06 PROCEDURE — 99213 OFFICE O/P EST LOW 20 MIN: CPT | Performed by: NURSE PRACTITIONER

## 2020-01-06 PROCEDURE — 36416 COLLJ CAPILLARY BLOOD SPEC: CPT | Performed by: FAMILY MEDICINE

## 2020-01-06 PROCEDURE — 85610 PROTHROMBIN TIME: CPT | Mod: QW | Performed by: FAMILY MEDICINE

## 2020-01-06 NOTE — PATIENT INSTRUCTIONS
Doing well continue treatment without change.   Continue Physical therapy and follow up with ortho.   I will look into length of oral coumadin and call with that information.    Plan on return to clinic in 4-6 weeks for pain and progress.

## 2020-01-06 NOTE — PROGRESS NOTES
ANTICOAGULATION FOLLOW-UP CLINIC VISIT    Patient Name:  Burton Pillai  Date:  1/6/2020  Contact Type:  Telephone/ LM with dosing instructions    SUBJECTIVE:  Patient Findings     Comments:   I left a detailed voicemail with the orders below. I have also requested a call back if there have been any missed doses, concerns, illness, fever, or if there have been any changes in medications, activity level, or diet          Clinical Outcomes     Negatives:   Major bleeding event, Thromboembolic event, Anticoagulation-related hospital admission, Anticoagulation-related ED visit, Anticoagulation-related fatality    Comments:   I left a detailed voicemail with the orders below. I have also requested a call back if there have been any missed doses, concerns, illness, fever, or if there have been any changes in medications, activity level, or diet             OBJECTIVE    INR Point of Care   Date Value Ref Range Status   01/06/2020 3.0 (H) 0.86 - 1.14 Final     Comment:     This test is intended for monitoring Coumadin therapy.  Results are not   accurate in patients with prolonged INR due to factor deficiency.         ASSESSMENT / PLAN  INR assessment THER    Recheck INR In: 4 DAYS    INR Location Outside lab      Anticoagulation Summary  As of 1/6/2020    INR goal:   2.0-3.0   TTR:   0.0 % (1 wk)   INR used for dosing:   3.0 (1/6/2020)   Warfarin maintenance plan:   5 mg (2.5 mg x 2) every Mon, Fri; 7.5 mg (7.5 mg x 1) all other days   Full warfarin instructions:   5 mg every Mon, Fri; 7.5 mg all other days   Weekly warfarin total:   47.5 mg   Plan last modified:   Heidy Matute RN (1/6/2020)   Next INR check:   1/10/2020   Priority:   High   Target end date:   12/20/2020    Indications    Other acute pulmonary embolism without acute cor pulmonale (H) [I26.99]             Anticoagulation Episode Summary     INR check location:       Preferred lab:       Send INR reminders to:   ODALIS TIDWELL    Comments:   7.5  mg and 2.5 mg tabs- will transition to 2.5 mg only, very little greens, AM dose      Anticoagulation Care Providers     Provider Role Specialty Phone number    Claudia Mascorro APRN CNP Referring Nurse Practitioner - Family [phone]            See the Encounter Report to view Anticoagulation Flowsheet and Dosing Calendar (Go to Encounters tab in chart review, and find the Anticoagulation Therapy Visit)    Dosage adjustment made based on physician directed care plan.    Heidy Matute RN

## 2020-01-08 ENCOUNTER — TELEPHONE (OUTPATIENT)
Dept: FAMILY MEDICINE | Facility: CLINIC | Age: 30
End: 2020-01-08

## 2020-01-08 ENCOUNTER — TELEPHONE (OUTPATIENT)
Dept: FAMILY MEDICINE | Facility: OTHER | Age: 30
End: 2020-01-08

## 2020-01-08 NOTE — TELEPHONE ENCOUNTER
Our goal is to have forms completed with 72 hours, however some forms may require a visit or additional information.    Who is the form from?: [facility] (if other please explain)  Where the form came from: form was faxed in  What clinic location was the form placed at?: [facility]  Where the form was placed: forms bin  What number is listed as a contact on the form?: [phone]    Phone call message- patient request for a letter, form or note:    Date needed: as soon as possible  Please fax to [phone]  Has the patient signed a consent form for release of information? NO    Additional comments:     Call taken on 1/8/2020 at 3:02 PM by Basia Jaime    Type of letter, form or note: medical

## 2020-01-10 ENCOUNTER — MEDICAL CORRESPONDENCE (OUTPATIENT)
Dept: HEALTH INFORMATION MANAGEMENT | Facility: CLINIC | Age: 30
End: 2020-01-10

## 2020-01-10 ENCOUNTER — ANTICOAGULATION THERAPY VISIT (OUTPATIENT)
Dept: ANTICOAGULATION | Facility: OTHER | Age: 30
End: 2020-01-10
Payer: MEDICAID

## 2020-01-10 DIAGNOSIS — I26.99 OTHER ACUTE PULMONARY EMBOLISM WITHOUT ACUTE COR PULMONALE (H): ICD-10-CM

## 2020-01-10 LAB — INR POINT OF CARE: 2.9 (ref 0.86–1.14)

## 2020-01-10 PROCEDURE — 99207 ZZC NO CHARGE NURSE ONLY: CPT

## 2020-01-10 PROCEDURE — 36416 COLLJ CAPILLARY BLOOD SPEC: CPT

## 2020-01-10 PROCEDURE — 85610 PROTHROMBIN TIME: CPT | Mod: QW

## 2020-01-10 NOTE — PROGRESS NOTES
ANTICOAGULATION FOLLOW-UP CLINIC VISIT    Patient Name:  Burton Pillai  Date:  1/10/2020  Contact Type:  Face to Face    SUBJECTIVE:  Patient Findings     Comments:   .noopr          Clinical Outcomes     Comments:   .noopr             OBJECTIVE    INR Protime   Date Value Ref Range Status   01/10/2020 2.9 (A) 0.86 - 1.14 Final       ASSESSMENT / PLAN  INR assessment THER    Recheck INR In: 1 WEEK    INR Location Clinic      Anticoagulation Summary  As of 1/10/2020    INR goal:   2.0-3.0   TTR:   32.3 % (1.6 wk)   INR used for dosin.9 (1/10/2020)   Warfarin maintenance plan:   5 mg (2.5 mg x 2) every Mon, Wed, Fri; 7.5 mg (7.5 mg x 1) all other days   Full warfarin instructions:   5 mg every Mon, Wed, Fri; 7.5 mg all other days   Weekly warfarin total:   45 mg   Plan last modified:   Cherie Choi RN (1/10/2020)   Next INR check:   2020   Priority:   High   Target end date:   2020    Indications    Other acute pulmonary embolism without acute cor pulmonale (H) [I26.99]             Anticoagulation Episode Summary     INR check location:       Preferred lab:       Send INR reminders to:   ODALIS TIDWELL    Comments:   7.5 mg and 2.5 mg tabs- will transition to 2.5 mg only, very little greens, AM dose      Anticoagulation Care Providers     Provider Role Specialty Phone number    Frandebbie ClaudiaKWAME Messina CNP Referring Nurse Practitioner - Family [phone]            See the Encounter Report to view Anticoagulation Flowsheet and Dosing Calendar (Go to Encounters tab in chart review, and find the Anticoagulation Therapy Visit)    Dosage adjustment made based on physician directed care plan.    Cherie Cohi RN

## 2020-01-13 ENCOUNTER — MYC REFILL (OUTPATIENT)
Dept: FAMILY MEDICINE | Facility: OTHER | Age: 30
End: 2020-01-13

## 2020-01-13 DIAGNOSIS — S72.322D CLOSED DISPLACED TRANSVERSE FRACTURE OF SHAFT OF LEFT FEMUR WITH ROUTINE HEALING, SUBSEQUENT ENCOUNTER: ICD-10-CM

## 2020-01-15 ENCOUNTER — HOSPITAL ENCOUNTER (OUTPATIENT)
Dept: PHYSICAL THERAPY | Facility: OTHER | Age: 30
Setting detail: THERAPIES SERIES
End: 2020-01-15
Attending: ORTHOPAEDIC SURGERY
Payer: MEDICAID

## 2020-01-15 DIAGNOSIS — S72.322D CLOSED DISPLACED TRANSVERSE FRACTURE OF SHAFT OF LEFT FEMUR WITH ROUTINE HEALING, SUBSEQUENT ENCOUNTER: ICD-10-CM

## 2020-01-15 PROCEDURE — 97110 THERAPEUTIC EXERCISES: CPT | Mod: GP

## 2020-01-15 PROCEDURE — 97161 PT EVAL LOW COMPLEX 20 MIN: CPT | Mod: GP

## 2020-01-15 RX ORDER — OXYCODONE HYDROCHLORIDE 5 MG/1
5 TABLET ORAL EVERY 6 HOURS PRN
Qty: 20 TABLET | Refills: 0 | Status: SHIPPED | OUTPATIENT
Start: 2020-01-15 | End: 2020-01-31

## 2020-01-15 RX ORDER — OXYCODONE HYDROCHLORIDE 5 MG/1
TABLET ORAL
Qty: 20 TABLET | Refills: 0 | OUTPATIENT
Start: 2020-01-15

## 2020-01-15 RX ORDER — CYCLOBENZAPRINE HCL 5 MG
5 TABLET ORAL 3 TIMES DAILY PRN
Qty: 30 TABLET | Refills: 1 | Status: SHIPPED | OUTPATIENT
Start: 2020-01-15 | End: 2020-06-30

## 2020-01-15 NOTE — PROGRESS NOTES
Subjective     Burton Pillai is a 29 year old male who presents to clinic today for the following health issues:    HPI   Joint Pain    Onset: 1 month     Description:   Location: Left femur  Character: stiff     Intensity: mild, 4/10    Progression of Symptoms: better    Accompanying Signs & Symptoms:  Other symptoms: none    History:   Previous similar pain: no       Precipitating factors:   Trauma or overuse: YES- broke snowboarding last month    Alleviating factors:  Improved by: exercises, acetaminophen and physical therapy    Therapies Tried and outcome: Improving    Patient has follow up with surgeon on 2/3/2020 through [facility]. He is continuing PT and is using oxycodone only after PT exercises not every time. He is using tylenol as well 1-2000 mg of tylenol daily. Pain today 3-4/10  Denies SOB, calf pain, ankle swelling.     Patient Active Problem List   Diagnosis     Acne     Closed displaced transverse fracture of shaft of left femur (H)     Other pulmonary embolism without acute cor pulmonale (H)     Other acute pulmonary embolism without acute cor pulmonale (H)     History reviewed. No pertinent surgical history.    Social History     Tobacco Use     Smoking status: Passive Smoke Exposure - Never Smoker     Smokeless tobacco: Never Used     Tobacco comment: parents   Substance Use Topics     Alcohol use: Yes     Alcohol/week: 5.0 standard drinks     Types: 6 Cans of beer per week     Family History   Problem Relation Age of Onset     Diabetes Father      Hypertension Father      Heart Disease Father      Breast Cancer Maternal Grandmother      Lipids Maternal Grandmother      Thyroid Disease Paternal Grandmother          Current Outpatient Medications   Medication Sig Dispense Refill     acetaminophen (TYLENOL) 500 MG tablet Take 1,000 mg by mouth       cyclobenzaprine (FLEXERIL) 5 MG tablet Take 1 tablet (5 mg) by mouth 3 times daily as needed for muscle spasms 30 tablet 1     oxyCODONE  "(ROXICODONE) 5 MG tablet Take 1 tablet (5 mg) by mouth every 6 hours as needed for moderate to severe pain or severe pain 20 tablet 0     polyethylene glycol (MIRALAX/GLYCOLAX) packet Take 17 g by mouth       senna-docusate (SENOKOT-S/PERICOLACE) 8.6-50 MG tablet Take 1 tablet by mouth daily 30 tablet 0     warfarin ANTICOAGULANT (COUMADIN) 2.5 MG tablet Take 7.5 mg daily (3 tabs) or as directed by coumadin clinic 90 tablet 0     Allergies   Allergen Reactions     No Known Drug Allergies      Recent Labs   Lab Test 01/04/18  1614 08/05/16  1335   ALT  --  19   CR  --  0.80   GFRESTIMATED  --  >90  Non  GFR Calc     GFRESTBLACK  --  >90   GFR Calc     POTASSIUM  --  3.9   TSH 1.63  --       BP Readings from Last 3 Encounters:   01/22/20 118/70   01/06/20 120/74   12/20/19 122/78    Wt Readings from Last 3 Encounters:   01/22/20 84.8 kg (187 lb)   01/06/20 84.8 kg (187 lb)   12/20/19 86.6 kg (191 lb)                    Reviewed and updated as needed this visit by Provider         Review of Systems   ROS COMP: Constitutional, HEENT, cardiovascular, pulmonary, GI, , musculoskeletal, neuro, skin, endocrine and psych systems are negative, except as otherwise noted.      Objective    /70   Pulse 80   Temp 97.7  F (36.5  C)   Resp 16   Ht 1.875 m (6' 1.82\")   Wt 84.8 kg (187 lb)   SpO2 98%   BMI 24.13 kg/m    Body mass index is 24.13 kg/m .  Physical Exam   GENERAL: healthy, alert and no distress  NECK: no adenopathy, no asymmetry, masses, or scars and thyroid normal to palpation  RESP: lungs clear to auscultation - no rales, rhonchi or wheezes  CV: regular rate and rhythm, normal S1 S2, no S3 or S4, no murmur, click or rub, no peripheral edema and peripheral pulses strong  MS: No gross musculoskeletal defects noted. No edema  SKIN: no suspicious lesions or rashes  NEURO: Normal strength and tone, mentation intact and speech normal      Assessment & Plan     1. Other acute " pulmonary embolism without acute cor pulmonale (H)  Continue coumadin clinic. Will address in June if needing to continue.     2. Closed displaced transverse fracture of shaft of left femur with routine healing, subsequent encounter  Continue to work with PT and cutting down on opioid use.     Doing well f/u in 4-6 weeks.        Patient Instructions   Please return to clinic in 4-6 weeks for follow up if doing well.     Thank you  Claudia Mascorro CNP        No follow-ups on file.    KWAME Nichols CNP  [facility]

## 2020-01-15 NOTE — TELEPHONE ENCOUNTER
Refills sent Oxycodone continue to wean down on this and Flexeril for muscle spasms. will need ov for further refills due for follow up in 3 weeks.     Claudia Mascorro CNP

## 2020-01-15 NOTE — PROGRESS NOTES
01/15/20 0932   General Information   Type of Visit Initial OP Ortho PT Evaluation   Start of Care Date 01/15/20   Referring Physician Bisi Morel PA-C    Patient/Family Goals Statement Hoping he can walk without AD, pain reduction, building up strength   Orders Evaluate and Treat   Orders Comment Goals: hip, knee and ankle ROM exercises; core strengthening; gait training, may WBAT using AD as needed; balance/proprioceptive activities; HEP    Date of Order 01/07/20   Certification Required? Yes   Medical Diagnosis s/p intramedullary nailing of left femoral shaft fracture    Surgical/Medical history reviewed Yes   Precautions/Limitations   (Anticoagulation and hx PE)   Weight-Bearing Status - LLE weight-bearing as tolerated   Special Instructions AROM, AAROM, passive ROM, strengthening, stretching, proprioception/balance   General Information Comments DOS 12/14/2019       Present No   Body Part(s)   Body Part(s) Hip   Presentation and Etiology   Pertinent history of current problem (include personal factors and/or comorbidities that impact the POC) Pt presents s/p L femur nailing (12/14/2019) after femoral fracture on 12/13/2019, complicated by PE after surgery (currently undergoing anticoagulation therapy, INR 2.9 at last check with goal range of 2.0-3.0, per anticoagulation notes). He was on O2 at home but has been off it for several weeks with no concerns (cleared by CNP to discontinue). Things have overall improved with his leg, but he is still utilizing a cane at home and a walker when outside, having tried axillary crutches but worrying they weren't sized appropriately for him. He uses AD because he doesn't trust putting all of weight on L foot. He was given a few exercises including quad sets, and is doing circles around house to walk as much as he can (using cane). PMHx: pulmonary embolism and subsequent (current) anticoagulation therapy, fracture of L femur. Surg: nailing of  L femur s/p fracture (12/14/2019). Meds: stool softener, oxycodone, mm relaxers, coumadin. Denies other medications and surgical history.      Impairments A. Pain;B. Decreased WB tolerance;D. Decreased ROM;E. Decreased flexibility;F. Decreased strength and endurance;G. Impaired balance;H. Impaired gait;N. Headaches   Functional Limitations perform activities of daily living;perform required work activities;perform desired leisure / sports activities   Symptom Location Distal thigh, more on lateral side   How/Where did it occur With a fall  (snowboarding)   Onset date of current episode/exacerbation 12/13/19  (12/14/2019 DOS)   Chronicity New   Pain rating (0-10 point scale) Best (/10);Worst (/10)  (now: 5/10)   Best (/10) 0/10  (relaxing, laying in bed, )   Worst (/10) 7-8/10  (up and exercising, walking around house)   Pain quality C. Aching   Frequency of pain/symptoms B. Intermittent  (worse with activit)   Pain/symptoms are: Worse in the morning   Pain/symptoms exacerbated by B. Walking;K. Home tasks;L. Work tasks;J. ADL   Pain/symptoms eased by C. Rest;H. Cold;I. OTC medication(s)  (oxycodone and mm relaxer)   Progression of symptoms since onset: Improved   Prior Level of Function   Prior Level of Function-Mobility IND   Prior Level of Function-ADLs IND   Functional Level Prior Comment Pt young and fit, snowboarding up to accident   Current Level of Function   Current Community Support Family/friend caregiver  (wife)   Patient role/employment history A. Employed   Employment Comments currently not working d/t injury. 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    [facility]   Home/community accessibility Stairs (no railing, 10 in the house, 2 steps to get into house), no trouble getting up and down them, going on his butt   Current equipment-Gait/Locomotion Walker;Axillary crutches;Standard cane  (Not using cruthes as they aren't sized,  cane at home)   Current equipment-ADL Shower/tub chair;Wall grab bar  (bar by toilet)   Fall Risk Screen   Fall screen completed by PT   Have you fallen 2 or more times in the past year? No   Have you fallen and had an injury in the past year? Yes  (fell snowboarding, cause of current injury)   Timed Up and Go score (seconds) 17 sec  (average of two trials)   Is patient a fall risk? Yes  (minor)   Fall screen comments Fall with injury but situational, currently using AD with significant limp after LE surgery, so minor fall risk, otherwise healthy and active young individual with no concerns   Functional Scales   Functional Scales Other   Other Scales  LEFS: 43/80 (MCID 9)   Hip Objective Findings   Side (if bilateral, select both right and left) Left   Observation pt at ease in session, hooks R foot under L ankle to transfer from supine to sit on edge of plinth indicating weakness and/or pain avoidance. Tolerated exam well.    Integumentary  Not visualized, staples removed per pt with no concerns   Posture Heavy use of walker in gait   Gait/Locomotion Limited WBing on L leg, reduced stance time on L, antalgic gait with FWW, toe-out positioning on L   Balance/Proprioception (Single Leg Stance) NT d/t pain   Hip ROM Comments L knee AROM ext: lacking 35, knee hanging in ext: lacking 5 deg. AROM heel slide into flexion: 124.    Hip/Knee Strength Comments NT d/t recent surgery and INR   Left Hip Flexion PROM ~90 with pain in supine (AROM appeared full in sitting)   Left Hip Abduction PROM ~40   Left Hip ER PROM ~45 with pain   Left Hip IR PROM ~30 no pain, just tight   Left Knee Extension Strength Limited L knee strength indicated by extensor lag and limited SAQ despite available range. Poor VMO and quad set on L.    Planned Therapy Interventions   Planned Therapy Interventions balance training;gait training;joint mobilization;manual therapy;neuromuscular re-education;ROM;strengthening;stretching   Planned Modality  Interventions   Planned Modality Interventions Cryotherapy;Electrical stimulation;Hot packs   Planned Modality Interventions Comments prn   Clinical Impression   Criteria for Skilled Therapeutic Interventions Met yes, treatment indicated   PT Diagnosis LLE dysfunction s/p femoral fracture and nailing procedure   Influenced by the following impairments Pain, ROM loss, reduced activity tolerance, impaired gait   Functional limitations due to impairments Impaired community ambulation, impaired work ability, impaired leisure and recreation   Clinical Presentation Stable/Uncomplicated   Clinical Presentation Rationale Complex medical history but improving overall, typical presentation in clinic   Clinical Decision Making (Complexity) Low complexity   Therapy Frequency 2 times/Week   Predicted Duration of Therapy Intervention (days/wks) 8 weeks   Risk & Benefits of therapy have been explained Yes   Patient, Family & other staff in agreement with plan of care Yes   Clinical Impression Comments Pt presents s/p femoral fracture and pulmonary embolism. he has reduced ROM and strength, as well as impaired gait. He is still receiving anticoagulation therapy.    Education Assessment   Preferred Learning Style Listening;Demonstration;Pictures/video   Barriers to Learning No barriers   ORTHO GOALS   PT Ortho Eval Goals 1;2   Ortho Goal 1   Goal Identifier Return to work   Goal Description Pt will report return to work with full duties in order to fulfill financial obligations   Target Date 03/11/20   Ortho Goal 2   Goal Identifier LEFS   Goal Description Pt will improve LEFS score by at least 9 in order to show improvement in overall functional movement.    Target Date 03/11/20   Total Evaluation Time   PT Eval, Low Complexity Minutes (41591) 35   Therapy Certification   Certification date from 01/15/20   Certification date to 04/13/20   Medical Diagnosis s/p intramedullary nailing of left femoral shaft fracture      Thank you for  your referral,    Forest Bagley PT, DPT    [facility]    O: [phone]  E: [email or web address]

## 2020-01-16 NOTE — TELEPHONE ENCOUNTER
Pending Prescriptions:                       Disp   Refills    oxyCODONE (ROXICODONE) 5 MG tablet [Pharm*20 tab*0            Sig: TAKE ONE TABLET BY MOUTH EVERY 6 HOURS AS NEEDED           FOR MODERATE TO SEVERE PAIN OR SEVERE PAIN    Duplicate request    Bella Owens, MSN, RN

## 2020-01-16 NOTE — PROGRESS NOTES
[facility]          OUTPATIENT PHYSICAL THERAPY ORTHOPEDIC EVALUATION  PLAN OF TREATMENT FOR OUTPATIENT REHABILITATION  (COMPLETE FOR INITIAL CLAIMS ONLY)  Patient's Last Name, First Name, M.I.  YOB: 1990  Burton Pillai    Provider s Name:  [facility]   Medical Record No.  [id number]   Start of Care Date:  01/15/20   Onset Date:  12/13/19(12/14/2019 DOS)   Type:     _X__PT   ___OT   ___SLP Medical Diagnosis:  s/p intramedullary nailing of left femoral shaft fracture      PT Diagnosis:  LLE dysfunction s/p femoral fracture and nailing procedure   Visits from SOC:  1      _________________________________________________________________________________  Plan of Treatment/Functional Goals:  balance training, gait training, joint mobilization, manual therapy, neuromuscular re-education, ROM, strengthening, stretching     Cryotherapy, Electrical stimulation, Hot packs  prn  Goals  Goal Identifier: Return to work  Goal Description: Pt will report return to work with full duties in order to fulfill financial obligations  Target Date: 03/11/20    Goal Identifier: LEFS  Goal Description: Pt will improve LEFS score by at least 9 in order to show improvement in overall functional movement.   Target Date: 03/11/20                                                                      Therapy Frequency:  2 times/Week  Predicted Duration of Therapy Intervention:  8 weeks    Forest Bagley, PT                 I CERTIFY THE NEED FOR THESE SERVICES FURNISHED UNDER        THIS PLAN OF TREATMENT AND WHILE UNDER MY CARE .             Physician Signature               Date    X_____________________________________________________                             Certification Date From:  01/15/20   Certification Date To:  04/13/20    Referring Provider:  Bisi Morel PA-C     Initial Assessment        See Epic Evaluation Start of Care Date: 01/15/20

## 2020-01-17 ENCOUNTER — ANTICOAGULATION THERAPY VISIT (OUTPATIENT)
Dept: ANTICOAGULATION | Facility: OTHER | Age: 30
End: 2020-01-17
Payer: MEDICAID

## 2020-01-17 DIAGNOSIS — I26.99 OTHER ACUTE PULMONARY EMBOLISM WITHOUT ACUTE COR PULMONALE (H): ICD-10-CM

## 2020-01-17 LAB — INR POINT OF CARE: 3.5 (ref 0.86–1.14)

## 2020-01-17 PROCEDURE — 85610 PROTHROMBIN TIME: CPT | Mod: QW

## 2020-01-17 PROCEDURE — 99207 ZZC NO CHARGE NURSE ONLY: CPT

## 2020-01-17 PROCEDURE — 36416 COLLJ CAPILLARY BLOOD SPEC: CPT

## 2020-01-17 NOTE — PROGRESS NOTES
ANTICOAGULATION FOLLOW-UP CLINIC VISIT    Patient Name:  Burton Pillai  Date:  1/17/2020  Contact Type:  Face to Face    SUBJECTIVE:  Patient Findings     Comments:   The patient was assessed for diet, medication, and activity level changes, missed or extra doses, bruising or bleeding, with no problem findings.          Clinical Outcomes     Comments:   The patient was assessed for diet, medication, and activity level changes, missed or extra doses, bruising or bleeding, with no problem findings.             OBJECTIVE    INR Protime   Date Value Ref Range Status   01/17/2020 3.5 (A) 0.86 - 1.14 Final       ASSESSMENT / PLAN  INR assessment SUPRA    Recheck INR In: 1 WEEK    INR Location Clinic      Anticoagulation Summary  As of 1/17/2020    INR goal:   2.0-3.0   TTR:   26.2 % (2.6 wk)   INR used for dosing:   3.5! (1/17/2020)   Warfarin maintenance plan:   7.5 mg (7.5 mg x 1) every Sun, Tue, Thu; 5 mg (2.5 mg x 2) all other days   Full warfarin instructions:   7.5 mg every Sun, Tue, Thu; 5 mg all other days   Weekly warfarin total:   42.5 mg   Plan last modified:   Cherie Choi, RN (1/17/2020)   Next INR check:   1/24/2020   Priority:   High   Target end date:   12/20/2020    Indications    Other acute pulmonary embolism without acute cor pulmonale (H) [I26.99]             Anticoagulation Episode Summary     INR check location:       Preferred lab:       Send INR reminders to:   ODALIS TIDWELL    Comments:   7.5 mg and 2.5 mg tabs- will transition to 2.5 mg only, very little greens, AM dose      Anticoagulation Care Providers     Provider Role Specialty Phone number    Claudia Mascorro APRN CNP Referring Nurse Practitioner - Family [phone]            See the Encounter Report to view Anticoagulation Flowsheet and Dosing Calendar (Go to Encounters tab in chart review, and find the Anticoagulation Therapy Visit)    Dosage adjustment made based on physician directed care plan.    Cherie  BIJAN Choi

## 2020-01-20 ENCOUNTER — HOSPITAL ENCOUNTER (OUTPATIENT)
Dept: PHYSICAL THERAPY | Facility: OTHER | Age: 30
Setting detail: THERAPIES SERIES
End: 2020-01-20
Attending: FAMILY MEDICINE
Payer: MEDICAID

## 2020-01-20 PROCEDURE — 97530 THERAPEUTIC ACTIVITIES: CPT | Mod: GP | Performed by: PHYSICAL THERAPIST

## 2020-01-20 PROCEDURE — 97110 THERAPEUTIC EXERCISES: CPT | Mod: GP | Performed by: PHYSICAL THERAPIST

## 2020-01-22 ENCOUNTER — OFFICE VISIT (OUTPATIENT)
Dept: FAMILY MEDICINE | Facility: OTHER | Age: 30
End: 2020-01-22
Payer: MEDICAID

## 2020-01-22 ENCOUNTER — HOSPITAL ENCOUNTER (OUTPATIENT)
Dept: PHYSICAL THERAPY | Facility: OTHER | Age: 30
Setting detail: THERAPIES SERIES
End: 2020-01-22
Attending: FAMILY MEDICINE
Payer: MEDICAID

## 2020-01-22 VITALS
SYSTOLIC BLOOD PRESSURE: 118 MMHG | WEIGHT: 187 LBS | OXYGEN SATURATION: 98 % | TEMPERATURE: 97.7 F | HEIGHT: 74 IN | RESPIRATION RATE: 16 BRPM | BODY MASS INDEX: 24 KG/M2 | DIASTOLIC BLOOD PRESSURE: 70 MMHG | HEART RATE: 80 BPM

## 2020-01-22 DIAGNOSIS — I26.99 OTHER ACUTE PULMONARY EMBOLISM WITHOUT ACUTE COR PULMONALE (H): Primary | ICD-10-CM

## 2020-01-22 DIAGNOSIS — S72.322D CLOSED DISPLACED TRANSVERSE FRACTURE OF SHAFT OF LEFT FEMUR WITH ROUTINE HEALING, SUBSEQUENT ENCOUNTER: ICD-10-CM

## 2020-01-22 PROCEDURE — 97110 THERAPEUTIC EXERCISES: CPT | Mod: GP

## 2020-01-22 PROCEDURE — 99213 OFFICE O/P EST LOW 20 MIN: CPT | Performed by: NURSE PRACTITIONER

## 2020-01-22 ASSESSMENT — PAIN SCALES - GENERAL: PAINLEVEL: MODERATE PAIN (4)

## 2020-01-22 ASSESSMENT — MIFFLIN-ST. JEOR: SCORE: 1890.11

## 2020-01-22 NOTE — PATIENT INSTRUCTIONS
Please return to clinic in 4-6 weeks for follow up if doing well.     Thank you  Claudia Mascorro CNP

## 2020-01-24 ENCOUNTER — ANTICOAGULATION THERAPY VISIT (OUTPATIENT)
Dept: ANTICOAGULATION | Facility: OTHER | Age: 30
End: 2020-01-24
Payer: MEDICAID

## 2020-01-24 DIAGNOSIS — I26.99 OTHER ACUTE PULMONARY EMBOLISM WITHOUT ACUTE COR PULMONALE (H): ICD-10-CM

## 2020-01-24 LAB — INR POINT OF CARE: 3.5 (ref 0.86–1.14)

## 2020-01-24 PROCEDURE — 99207 ZZC NO CHARGE NURSE ONLY: CPT

## 2020-01-24 PROCEDURE — 85610 PROTHROMBIN TIME: CPT | Mod: QW

## 2020-01-24 PROCEDURE — 36416 COLLJ CAPILLARY BLOOD SPEC: CPT

## 2020-01-24 NOTE — PROGRESS NOTES
ANTICOAGULATION FOLLOW-UP CLINIC VISIT    Patient Name:  Burton Pillai  Date:  1/24/2020  Contact Type:  Face to Face    SUBJECTIVE:  Patient Findings     Comments:   unable to identify a reason for supratherapeutic INR.          Clinical Outcomes     Comments:   unable to identify a reason for supratherapeutic INR.             OBJECTIVE    INR Protime   Date Value Ref Range Status   01/24/2020 3.5 (A) 0.86 - 1.14 Final       ASSESSMENT / PLAN  INR assessment SUPRA    Recheck INR In: 1 WEEK    INR Location Clinic      Anticoagulation Summary  As of 1/24/2020    INR goal:   2.0-3.0   TTR:   18.9 % (3.6 wk)   INR used for dosing:   3.5! (1/24/2020)   Warfarin maintenance plan:   7.5 mg (2.5 mg x 3) every Tue; 5 mg (2.5 mg x 2) all other days   Full warfarin instructions:   1/24: 2.5 mg; Otherwise 7.5 mg every Tue; 5 mg all other days   Weekly warfarin total:   37.5 mg   Plan last modified:   Cherie Choi RN (1/24/2020)   Next INR check:   1/31/2020   Priority:   High   Target end date:   12/20/2020    Indications    Other acute pulmonary embolism without acute cor pulmonale (H) [I26.99]             Anticoagulation Episode Summary     INR check location:       Preferred lab:       Send INR reminders to:   ODALIS TIDWELL    Comments:   7.5 mg and 2.5 mg tabs- will transition to 2.5 mg only, very little greens, PM dose      Anticoagulation Care Providers     Provider Role Specialty Phone number    Claudia Mascorro APRN CNP Referring Nurse Practitioner - Family [phone]            See the Encounter Report to view Anticoagulation Flowsheet and Dosing Calendar (Go to Encounters tab in chart review, and find the Anticoagulation Therapy Visit)    Dosage adjustment made based on physician directed care plan.    Cherie Choi RN                 
electronic

## 2020-01-27 ENCOUNTER — HOSPITAL ENCOUNTER (OUTPATIENT)
Dept: PHYSICAL THERAPY | Facility: OTHER | Age: 30
Setting detail: THERAPIES SERIES
End: 2020-01-27
Attending: FAMILY MEDICINE
Payer: MEDICAID

## 2020-01-27 PROCEDURE — 97110 THERAPEUTIC EXERCISES: CPT | Mod: GP

## 2020-01-29 ENCOUNTER — HOSPITAL ENCOUNTER (OUTPATIENT)
Dept: PHYSICAL THERAPY | Facility: OTHER | Age: 30
Setting detail: THERAPIES SERIES
End: 2020-01-29
Attending: FAMILY MEDICINE
Payer: MEDICAID

## 2020-01-29 PROCEDURE — 97110 THERAPEUTIC EXERCISES: CPT | Mod: GP

## 2020-01-30 ENCOUNTER — MYC MEDICAL ADVICE (OUTPATIENT)
Dept: FAMILY MEDICINE | Facility: OTHER | Age: 30
End: 2020-01-30

## 2020-01-30 DIAGNOSIS — S72.322D CLOSED DISPLACED TRANSVERSE FRACTURE OF SHAFT OF LEFT FEMUR WITH ROUTINE HEALING, SUBSEQUENT ENCOUNTER: ICD-10-CM

## 2020-01-31 ENCOUNTER — ANTICOAGULATION THERAPY VISIT (OUTPATIENT)
Dept: ANTICOAGULATION | Facility: OTHER | Age: 30
End: 2020-01-31
Payer: MEDICAID

## 2020-01-31 DIAGNOSIS — I26.99 OTHER ACUTE PULMONARY EMBOLISM WITHOUT ACUTE COR PULMONALE (H): ICD-10-CM

## 2020-01-31 LAB — INR POINT OF CARE: 2.4 (ref 0.86–1.14)

## 2020-01-31 PROCEDURE — 85610 PROTHROMBIN TIME: CPT | Mod: QW

## 2020-01-31 PROCEDURE — 99207 ZZC NO CHARGE NURSE ONLY: CPT

## 2020-01-31 PROCEDURE — 36416 COLLJ CAPILLARY BLOOD SPEC: CPT

## 2020-01-31 RX ORDER — WARFARIN SODIUM 2.5 MG/1
TABLET ORAL
Qty: 90 TABLET | Refills: 0
Start: 2020-01-31 | End: 2020-03-11

## 2020-01-31 RX ORDER — OXYCODONE HYDROCHLORIDE 5 MG/1
5 TABLET ORAL EVERY 6 HOURS PRN
Qty: 20 TABLET | Refills: 0 | Status: SHIPPED | OUTPATIENT
Start: 2020-01-31 | End: 2020-06-30

## 2020-01-31 NOTE — PROGRESS NOTES
ANTICOAGULATION FOLLOW-UP CLINIC VISIT    Patient Name:  Burton Pillai  Date:  2020  Contact Type:  Face to Face    SUBJECTIVE:  Patient Findings     Comments:   The patient was assessed for diet, medication, and activity level changes, missed or extra doses, bruising or bleeding, with no problem findings.          Clinical Outcomes     Comments:   The patient was assessed for diet, medication, and activity level changes, missed or extra doses, bruising or bleeding, with no problem findings.             OBJECTIVE    INR Protime   Date Value Ref Range Status   2020 2.4 (A) 0.86 - 1.14 Final       ASSESSMENT / PLAN  INR assessment THER    Recheck INR In: 1 WEEK    INR Location Clinic      Anticoagulation Summary  As of 2020    INR goal:   2.0-3.0   TTR:   26.7 % (1.1 mo)   INR used for dosin.4 (2020)   Warfarin maintenance plan:   5 mg (2.5 mg x 2) every day   Full warfarin instructions:   5 mg every day   Weekly warfarin total:   35 mg   Plan last modified:   Cherie Choi RN (2020)   Next INR check:   2020   Priority:   High   Target end date:   2020    Indications    Other acute pulmonary embolism without acute cor pulmonale (H) [I26.99]             Anticoagulation Episode Summary     INR check location:       Preferred lab:       Send INR reminders to:   ODALIS TIDWELL    Comments:   2.5 mg tabs, very little greens, PM dose      Anticoagulation Care Providers     Provider Role Specialty Phone number    FranpapoClaudia weaver, KWAME GIBBS Referring Nurse Practitioner - Family [phone]            See the Encounter Report to view Anticoagulation Flowsheet and Dosing Calendar (Go to Encounters tab in chart review, and find the Anticoagulation Therapy Visit)    Dosage adjustment made based on physician directed care plan.    Cherie Choi RN

## 2020-02-05 ENCOUNTER — HOSPITAL ENCOUNTER (OUTPATIENT)
Dept: PHYSICAL THERAPY | Facility: OTHER | Age: 30
Setting detail: THERAPIES SERIES
End: 2020-02-05
Attending: FAMILY MEDICINE
Payer: MEDICAID

## 2020-02-05 PROCEDURE — 97530 THERAPEUTIC ACTIVITIES: CPT | Mod: GP | Performed by: PHYSICAL THERAPIST

## 2020-02-05 PROCEDURE — 97110 THERAPEUTIC EXERCISES: CPT | Mod: GP | Performed by: PHYSICAL THERAPIST

## 2020-02-10 ENCOUNTER — HOSPITAL ENCOUNTER (OUTPATIENT)
Dept: PHYSICAL THERAPY | Facility: OTHER | Age: 30
Setting detail: THERAPIES SERIES
End: 2020-02-10
Attending: FAMILY MEDICINE
Payer: MEDICAID

## 2020-02-10 PROCEDURE — 97140 MANUAL THERAPY 1/> REGIONS: CPT | Mod: GP | Performed by: PHYSICAL THERAPIST

## 2020-02-10 PROCEDURE — 97110 THERAPEUTIC EXERCISES: CPT | Mod: GP | Performed by: PHYSICAL THERAPIST

## 2020-02-12 ENCOUNTER — HOSPITAL ENCOUNTER (OUTPATIENT)
Dept: PHYSICAL THERAPY | Facility: OTHER | Age: 30
Setting detail: THERAPIES SERIES
End: 2020-02-12
Attending: FAMILY MEDICINE
Payer: MEDICAID

## 2020-02-12 PROCEDURE — 97110 THERAPEUTIC EXERCISES: CPT | Mod: GP | Performed by: PHYSICAL THERAPIST

## 2020-02-12 PROCEDURE — 97140 MANUAL THERAPY 1/> REGIONS: CPT | Mod: GP | Performed by: PHYSICAL THERAPIST

## 2020-02-19 ENCOUNTER — ANTICOAGULATION THERAPY VISIT (OUTPATIENT)
Dept: ANTICOAGULATION | Facility: CLINIC | Age: 30
End: 2020-02-19

## 2020-02-19 ENCOUNTER — HOSPITAL ENCOUNTER (OUTPATIENT)
Dept: PHYSICAL THERAPY | Facility: OTHER | Age: 30
Setting detail: THERAPIES SERIES
End: 2020-02-19
Attending: FAMILY MEDICINE
Payer: MEDICAID

## 2020-02-19 DIAGNOSIS — Z79.01 LONG TERM CURRENT USE OF ANTICOAGULANT THERAPY: ICD-10-CM

## 2020-02-19 DIAGNOSIS — I26.99 OTHER ACUTE PULMONARY EMBOLISM WITHOUT ACUTE COR PULMONALE (H): ICD-10-CM

## 2020-02-19 LAB
CAPILLARY BLOOD COLLECTION: NORMAL
INR BLD: 1.2 (ref 0.86–1.14)

## 2020-02-19 PROCEDURE — 99207 ZZC NO CHARGE NURSE ONLY: CPT | Performed by: FAMILY MEDICINE

## 2020-02-19 PROCEDURE — 85610 PROTHROMBIN TIME: CPT | Mod: QW | Performed by: FAMILY MEDICINE

## 2020-02-19 PROCEDURE — 36416 COLLJ CAPILLARY BLOOD SPEC: CPT | Performed by: FAMILY MEDICINE

## 2020-02-19 PROCEDURE — 97110 THERAPEUTIC EXERCISES: CPT | Mod: GP | Performed by: PHYSICAL THERAPIST

## 2020-02-19 NOTE — PROGRESS NOTES
ANTICOAGULATION FOLLOW-UP CLINIC VISIT    Patient Name:  Burton Pillai  Date:  2020  Contact Type:  Telephone    SUBJECTIVE:  Patient Findings     Comments:   Dose was lowered at last OV and pt did not make his last appt. Has been taking 5 mg daily. Will increase maintenance dose and recheck in 1 week. Heidy Matute RN, BSN          Clinical Outcomes     Comments:   Dose was lowered at last OV and pt did not make his last appt. Has been taking 5 mg daily. Will increase maintenance dose and recheck in 1 week. Heidy Matute RN, BSN             OBJECTIVE    INR Point of Care   Date Value Ref Range Status   2020 1.2 (H) 0.86 - 1.14 Final     Comment:     This test is intended for monitoring Coumadin therapy.  Results are not   accurate in patients with prolonged INR due to factor deficiency.         ASSESSMENT / PLAN  INR assessment SUB    Recheck INR In: 1 WEEK    INR Location Outside lab      Anticoagulation Summary  As of 2020    INR goal:   2.0-3.0   TTR:   29.2 % (1.7 mo)   INR used for dosin.2! (2020)   Warfarin maintenance plan:   No maintenance plan   Full warfarin instructions:   : 7.5 mg; : 5 mg; 2/21: 7.5 mg; 2/22: 5 mg; 2/23: 5 mg; 2/24: 7.5 mg; 2/25: 5 mg   Plan last modified:   Heidy Matute RN (2020)   Next INR check:   2020   Priority:   High   Target end date:   2020    Indications    Other acute pulmonary embolism without acute cor pulmonale (H) [I26.99]             Anticoagulation Episode Summary     INR check location:       Preferred lab:       Send INR reminders to:   ODALIS TIDWELL    Comments:   2.5 mg tabs, very little greens, PM dose      Anticoagulation Care Providers     Provider Role Specialty Phone number    Claudia Mascorro, KWAME GIBBS Referring Nurse Practitioner - Family [phone]            See the Encounter Report to view Anticoagulation Flowsheet and Dosing Calendar (Go to Encounters tab in chart review, and  find the Anticoagulation Therapy Visit)    Dosage adjustment made based on physician directed care plan.    Heidy Matute RN

## 2020-02-26 ENCOUNTER — HOSPITAL ENCOUNTER (OUTPATIENT)
Dept: PHYSICAL THERAPY | Facility: OTHER | Age: 30
Setting detail: THERAPIES SERIES
End: 2020-02-26
Attending: FAMILY MEDICINE
Payer: MEDICAID

## 2020-02-26 PROCEDURE — 97110 THERAPEUTIC EXERCISES: CPT | Mod: GP | Performed by: PHYSICAL THERAPIST

## 2020-02-26 PROCEDURE — 97140 MANUAL THERAPY 1/> REGIONS: CPT | Mod: GP | Performed by: PHYSICAL THERAPIST

## 2020-02-26 NOTE — PROGRESS NOTES
Subjective     Burton Pillai is a 29 year old male who presents to clinic today for the following health issues:    HPI     RECHECK Leg injury and pulmonary embolism     DOI: 12/14/2019    Description:   Broke left femur snowboarding and had a pulmonary embolism.     Currently still in PT and improving each week, walks with a cane.  No lung troubles. Still takes Coumadin but nothing else      Patient states that he had one bout of pain this is improved.   Continues to go to coumadin clinic for post pe treatment coumadin management. disucssed will continue coumadin for now un              Patient Active Problem List   Diagnosis     Acne     Closed displaced transverse fracture of shaft of left femur (H)     Other pulmonary embolism without acute cor pulmonale (H)     Other acute pulmonary embolism without acute cor pulmonale (H)     History reviewed. No pertinent surgical history.    Social History     Tobacco Use     Smoking status: Passive Smoke Exposure - Never Smoker     Smokeless tobacco: Never Used     Tobacco comment: parents   Substance Use Topics     Alcohol use: Yes     Alcohol/week: 5.0 standard drinks     Types: 6 Cans of beer per week     Family History   Problem Relation Age of Onset     Diabetes Father      Hypertension Father      Heart Disease Father      Breast Cancer Maternal Grandmother      Lipids Maternal Grandmother      Thyroid Disease Paternal Grandmother          Current Outpatient Medications   Medication Sig Dispense Refill     warfarin ANTICOAGULANT (COUMADIN) 2.5 MG tablet Take 5 mg daily or as directed by coumadin clinic 90 tablet 0     acetaminophen (TYLENOL) 500 MG tablet Take 1,000 mg by mouth       cyclobenzaprine (FLEXERIL) 5 MG tablet Take 1 tablet (5 mg) by mouth 3 times daily as needed for muscle spasms (Patient not taking: Reported on 3/4/2020) 30 tablet 1     oxyCODONE (ROXICODONE) 5 MG tablet Take 1 tablet (5 mg) by mouth every 6 hours as needed for moderate to severe  pain or severe pain (Patient not taking: Reported on 3/4/2020) 20 tablet 0     polyethylene glycol (MIRALAX/GLYCOLAX) packet Take 17 g by mouth       senna-docusate (SENOKOT-S/PERICOLACE) 8.6-50 MG tablet Take 1 tablet by mouth daily (Patient not taking: Reported on 3/4/2020) 30 tablet 0     Allergies   Allergen Reactions     No Known Drug Allergies      Recent Labs   Lab Test 01/04/18  1614 08/05/16  1335   ALT  --  19   CR  --  0.80   GFRESTIMATED  --  >90  Non  GFR Calc     GFRESTBLACK  --  >90   GFR Calc     POTASSIUM  --  3.9   TSH 1.63  --       BP Readings from Last 3 Encounters:   03/04/20 118/74   01/22/20 118/70   01/06/20 120/74    Wt Readings from Last 3 Encounters:   03/04/20 82.1 kg (181 lb)   01/22/20 84.8 kg (187 lb)   01/06/20 84.8 kg (187 lb)                    Reviewed and updated as needed this visit by Provider         Review of Systems   ROS COMP: Constitutional, HEENT, cardiovascular, pulmonary, GI, , musculoskeletal, neuro, skin, endocrine and psych systems are negative, except as otherwise noted.      Objective    /74   Pulse 82   Temp 98.5  F (36.9  C) (Temporal)   Resp 16   Wt 82.1 kg (181 lb)   SpO2 97%   BMI 23.35 kg/m    Body mass index is 23.35 kg/m .  Physical Exam   GENERAL: healthy, alert and no distress  NECK: no adenopathy, no asymmetry, masses, or scars and thyroid normal to palpation  RESP: lungs clear to auscultation - no rales, rhonchi or wheezes  CV: regular rate and rhythm, normal S1 S2, no S3 or S4, no murmur, click or rub, no peripheral edema and peripheral pulses strong  MS: extremities normal- no gross deformities noted  SKIN: no suspicious lesions or rashes    Diagnostic Test Results:  Labs reviewed in Epic        Assessment & Plan     1. Other acute pulmonary embolism without acute cor pulmonale (H)  Stable continue coumadin clinic    2. Closed displaced transverse fracture of shaft of left femur with routine healing,  subsequent encounter  Stable continue PT         Patient Instructions   Continue physical therapy and follow up with surgeon as discussed. You will continue coumadin until June then will discontinue.     Thank you  Claudia Mascorro [facility]

## 2020-03-04 ENCOUNTER — HOSPITAL ENCOUNTER (OUTPATIENT)
Dept: PHYSICAL THERAPY | Facility: OTHER | Age: 30
Setting detail: THERAPIES SERIES
End: 2020-03-04
Attending: PHYSICIAN ASSISTANT
Payer: COMMERCIAL

## 2020-03-04 ENCOUNTER — OFFICE VISIT (OUTPATIENT)
Dept: FAMILY MEDICINE | Facility: OTHER | Age: 30
End: 2020-03-04
Payer: COMMERCIAL

## 2020-03-04 VITALS
SYSTOLIC BLOOD PRESSURE: 118 MMHG | HEART RATE: 82 BPM | DIASTOLIC BLOOD PRESSURE: 74 MMHG | BODY MASS INDEX: 23.35 KG/M2 | OXYGEN SATURATION: 97 % | TEMPERATURE: 98.5 F | RESPIRATION RATE: 16 BRPM | WEIGHT: 181 LBS

## 2020-03-04 DIAGNOSIS — S72.322D CLOSED DISPLACED TRANSVERSE FRACTURE OF SHAFT OF LEFT FEMUR WITH ROUTINE HEALING, SUBSEQUENT ENCOUNTER: ICD-10-CM

## 2020-03-04 DIAGNOSIS — I26.99 OTHER ACUTE PULMONARY EMBOLISM WITHOUT ACUTE COR PULMONALE (H): Primary | ICD-10-CM

## 2020-03-04 PROCEDURE — 99213 OFFICE O/P EST LOW 20 MIN: CPT | Performed by: NURSE PRACTITIONER

## 2020-03-04 PROCEDURE — 97110 THERAPEUTIC EXERCISES: CPT | Mod: GP | Performed by: PHYSICAL THERAPIST

## 2020-03-04 NOTE — PATIENT INSTRUCTIONS
Continue physical therapy and follow up with surgeon as discussed. You will continue coumadin until June then will discontinue.     Thank you  Claudia Mascorro CNP

## 2020-03-09 ENCOUNTER — HOSPITAL ENCOUNTER (OUTPATIENT)
Dept: PHYSICAL THERAPY | Facility: OTHER | Age: 30
Setting detail: THERAPIES SERIES
End: 2020-03-09
Attending: PHYSICIAN ASSISTANT
Payer: COMMERCIAL

## 2020-03-09 PROCEDURE — 97110 THERAPEUTIC EXERCISES: CPT | Mod: GP | Performed by: PHYSICAL THERAPIST

## 2020-03-11 ENCOUNTER — HOSPITAL ENCOUNTER (OUTPATIENT)
Dept: PHYSICAL THERAPY | Facility: OTHER | Age: 30
Setting detail: THERAPIES SERIES
End: 2020-03-11
Attending: PHYSICIAN ASSISTANT
Payer: COMMERCIAL

## 2020-03-11 ENCOUNTER — ANTICOAGULATION THERAPY VISIT (OUTPATIENT)
Dept: ANTICOAGULATION | Facility: CLINIC | Age: 30
End: 2020-03-11

## 2020-03-11 ENCOUNTER — MYC REFILL (OUTPATIENT)
Dept: ANTICOAGULATION | Facility: OTHER | Age: 30
End: 2020-03-11

## 2020-03-11 DIAGNOSIS — I26.99 OTHER ACUTE PULMONARY EMBOLISM WITHOUT ACUTE COR PULMONALE (H): ICD-10-CM

## 2020-03-11 DIAGNOSIS — Z79.01 LONG TERM CURRENT USE OF ANTICOAGULANT THERAPY: ICD-10-CM

## 2020-03-11 LAB
CAPILLARY BLOOD COLLECTION: NORMAL
INR BLD: 1.1 (ref 0.86–1.14)

## 2020-03-11 PROCEDURE — 85610 PROTHROMBIN TIME: CPT | Mod: QW | Performed by: FAMILY MEDICINE

## 2020-03-11 PROCEDURE — 99207 ZZC NO CHARGE NURSE ONLY: CPT | Performed by: FAMILY MEDICINE

## 2020-03-11 PROCEDURE — 97110 THERAPEUTIC EXERCISES: CPT | Mod: GP | Performed by: PHYSICAL THERAPIST

## 2020-03-11 PROCEDURE — 36416 COLLJ CAPILLARY BLOOD SPEC: CPT | Performed by: FAMILY MEDICINE

## 2020-03-11 RX ORDER — WARFARIN SODIUM 2.5 MG/1
TABLET ORAL
Qty: 90 TABLET | Refills: 0 | Status: SHIPPED | OUTPATIENT
Start: 2020-03-11 | End: 2020-06-30

## 2020-03-11 NOTE — PROGRESS NOTES
ANTICOAGULATION FOLLOW-UP CLINIC VISIT    Patient Name:  Burton Pillai  Date:  3/11/2020  Contact Type:  Telephone    SUBJECTIVE:  Patient Findings     Positives:   Missed doses (per pt, he missed 3-4 days last week )             OBJECTIVE    INR Point of Care   Date Value Ref Range Status   2020 1.1 0.86 - 1.14 Final     Comment:     This test is intended for monitoring Coumadin therapy.  Results are not   accurate in patients with prolonged INR due to factor deficiency.         ASSESSMENT / PLAN  INR assessment SUB    Recheck INR In: 5 DAYS    INR Location Outside lab      Anticoagulation Summary  As of 3/11/2020    INR goal:   2.0-3.0   TTR:   20.7 % (2.4 mo)   INR used for dosin.1! (3/11/2020)   Warfarin maintenance plan:   No maintenance plan   Full warfarin instructions:   3/11: 7.5 mg; 3/12: 7.5 mg; 3/13: 7.5 mg; 3/14: 5 mg; 3/15: 5 mg   Plan last modified:   Francesca Persaud RN (3/11/2020)   Next INR check:   3/16/2020   Priority:   High   Target end date:   2020    Indications    Other acute pulmonary embolism without acute cor pulmonale (H) [I26.99]             Anticoagulation Episode Summary     INR check location:       Preferred lab:       Send INR reminders to:   ODALIS TIDWELL    Comments:   2.5 mg tabs, very little greens, PM dose      Anticoagulation Care Providers     Provider Role Specialty Phone number    Latrice Mascorroparrish Richter, KWAME CNP Referring Nurse Practitioner - Family [phone]            See the Encounter Report to view Anticoagulation Flowsheet and Dosing Calendar (Go to Encounters tab in chart review, and find the Anticoagulation Therapy Visit)    Dosage adjustment made based on physician directed care plan.    Francesca Persaud RN

## 2020-03-19 ENCOUNTER — HOSPITAL ENCOUNTER (OUTPATIENT)
Dept: PHYSICAL THERAPY | Facility: OTHER | Age: 30
Setting detail: THERAPIES SERIES
End: 2020-03-19
Attending: PHYSICIAN ASSISTANT
Payer: COMMERCIAL

## 2020-03-19 PROCEDURE — 97110 THERAPEUTIC EXERCISES: CPT | Mod: GP | Performed by: PHYSICAL THERAPIST

## 2020-06-30 ENCOUNTER — VIRTUAL VISIT (OUTPATIENT)
Dept: FAMILY MEDICINE | Facility: OTHER | Age: 30
End: 2020-06-30
Payer: COMMERCIAL

## 2020-06-30 ENCOUNTER — MYC MEDICAL ADVICE (OUTPATIENT)
Dept: FAMILY MEDICINE | Facility: OTHER | Age: 30
End: 2020-06-30

## 2020-06-30 DIAGNOSIS — H10.9 BACTERIAL CONJUNCTIVITIS: Primary | ICD-10-CM

## 2020-06-30 PROCEDURE — 99213 OFFICE O/P EST LOW 20 MIN: CPT | Mod: 95 | Performed by: NURSE PRACTITIONER

## 2020-06-30 RX ORDER — POLYMYXIN B SULFATE AND TRIMETHOPRIM 1; 10000 MG/ML; [USP'U]/ML
1-2 SOLUTION OPHTHALMIC EVERY 6 HOURS
Qty: 3 ML | Refills: 0 | Status: SHIPPED | OUTPATIENT
Start: 2020-06-30 | End: 2020-07-07

## 2020-06-30 NOTE — PATIENT INSTRUCTIONS
Patient Education     Conjunctivitis, Nonspecific    The membrane that covers the white part of your eye (the conjunctiva) is inflamed. Inflammation happens when your body responds to an injury, allergic reaction, infection, or illness. Symptoms of inflammation in the eye may include redness, irritation, itching, swelling, or burning. These symptoms should go away within the next 24 hours. Conjunctivitis may be related to a particle that was in your eye. If so, it may wash out with your tears or irrigation treatment. Being exposed to liquid chemicals or fumes may also cause this reaction.   Home care    Apply a cold pack over the eye for 20 minutes at a time. This will reduce pain. To make a cold pack, put ice cubes in a plastic bag that seals at the top. Wrap the bag in a clean, thin towel or cloth.    Artificial tears may be prescribed to reduce irritation or redness.  These should be used 3 to 4 times a day.    You may use acetaminophen or ibuprofen to control pain, unless another medicine was prescribed. (Note: If you have chronic liver or kidney disease, or if you have ever had a stomach ulcer or gastrointestinal bleeding, talk with your healthcare provider before using these medicines.)  Follow-up care  Follow up with your healthcare provider, or as advised.  When to seek medical advice  Call your healthcare provider right away if any of these occur:    Increased eyelid swelling    Increased eye pain    Increased redness or drainage from the eye    Increased blurry vision or increased sensitivity to light    Failure of normal vision to return within 24 to 48 hours  Date Last Reviewed: 7/1/2017 2000-2019 The "[employer] [address] [place] [address], [place] [address]. All rights reserved. This information is not intended as a substitute for professional medical care. Always follow your healthcare professional's instructions.

## 2020-06-30 NOTE — PROGRESS NOTES
"Burton Pillai is a 29 year old male who is being evaluated via a billable video visit.      The patient has been notified of following:     \"This video visit will be conducted via a call between you and your physician/provider. We have found that certain health care needs can be provided without the need for an in-person physical exam.  This service lets us provide the care you need with a video conversation.  If a prescription is necessary we can send it directly to your pharmacy.  If lab work is needed we can place an order for that and you can then stop by our lab to have the test done at a later time.    Video visits are billed at different rates depending on your insurance coverage.  Please reach out to your insurance provider with any questions.    If during the course of the call the physician/provider feels a video visit is not appropriate, you will not be charged for this service.\"    Patient has given verbal consent for Video visit? Yes  How would you like to obtain your AVS? EnderSherman  Patient would like the video invitation sent by: Text to cell phone: [phone]  Will anyone else be joining your video visit? No    Subjective     Burton Pillai is a 29 year old male who presents today via video visit for the following health issues:    HPI  Patient woke up today with puffy right eye, states it was reddish and crusty.   Patient states eye is irritated and feels scratchy not painful denies visual changes. He has not been scratching or rubbing. Mild drainage from eye noted tan in color.        Video Start Time: 1300    Patient Active Problem List   Diagnosis     Acne     Closed displaced transverse fracture of shaft of left femur (H)     Other pulmonary embolism without acute cor pulmonale (H)     Other acute pulmonary embolism without acute cor pulmonale (H)     History reviewed. No pertinent surgical history.    Social History     Tobacco Use     Smoking status: Passive Smoke Exposure - Never " Smoker     Smokeless tobacco: Never Used     Tobacco comment: parents   Substance Use Topics     Alcohol use: Yes     Alcohol/week: 5.0 standard drinks     Types: 6 Cans of beer per week     Family History   Problem Relation Age of Onset     Diabetes Father      Hypertension Father      Heart Disease Father      Breast Cancer Maternal Grandmother      Lipids Maternal Grandmother      Thyroid Disease Paternal Grandmother          Current Outpatient Medications   Medication Sig Dispense Refill     trimethoprim-polymyxin b (POLYTRIM) 33203-4.1 UNIT/ML-% ophthalmic solution Place 1-2 drops into both eyes every 6 hours for 7 days 3 mL 0     acetaminophen (TYLENOL) 500 MG tablet Take 1,000 mg by mouth       polyethylene glycol (MIRALAX/GLYCOLAX) packet Take 17 g by mouth       Allergies   Allergen Reactions     No Known Drug Allergies      Recent Labs   Lab Test 01/04/18  1614 08/05/16  1335   ALT  --  19   CR  --  0.80   GFRESTIMATED  --  >90  Non  GFR Calc     GFRESTBLACK  --  >90   GFR Calc     POTASSIUM  --  3.9   TSH 1.63  --       BP Readings from Last 3 Encounters:   03/04/20 118/74   01/22/20 118/70   01/06/20 120/74    Wt Readings from Last 3 Encounters:   03/04/20 82.1 kg (181 lb)   01/22/20 84.8 kg (187 lb)   01/06/20 84.8 kg (187 lb)                    Reviewed and updated as needed this visit by Provider         Review of Systems   Constitutional, HEENT, cardiovascular, pulmonary, GI, , musculoskeletal, neuro, skin, endocrine and psych systems are negative, except as otherwise noted.      Objective             Physical Exam     GENERAL: Healthy, alert and no distress  EYES: PERRL, EOMI, visual fields normal, eyelids- mild inflammation right and conjunctiva/corneas- conjunctival injection OU and yellow colored discharge present right  RESP: No audible wheeze, cough, or visible cyanosis.  No visible retractions or increased work of breathing.    SKIN: Visible skin clear. No  significant rash, abnormal pigmentation or lesions.  NEURO: Cranial nerves grossly intact.  Mentation and speech appropriate for age.  PSYCH: Mentation appears normal, affect normal/bright, judgement and insight intact, normal speech and appearance well-groomed.    Assessment & Plan     1. Bacterial conjunctivitis  Home care instructions were reviewed with the patient. The risks, benefits and treatment options of prescribed medications or other treatments have been discussed with the patient. The patient verbalized their understanding and should call or follow up if no improvement or if they develop further problems.    - trimethoprim-polymyxin b (POLYTRIM) 18853-1.1 UNIT/ML-% ophthalmic solution; Place 1-2 drops into both eyes every 6 hours for 7 days  Dispense: 3 mL; Refill: 0       There are no Patient Instructions on file for this visit.    No follow-ups on file.    KWAME Nichols CNP  [facility]      Video-Visit Details    Type of service:  Video Visit    Video End Time:1304    Originating Location (pt. Location): Home    Distant Location (provider location):  [facility]     Platform used for Video Visit: KWAME Cantu CNP

## 2020-07-20 ENCOUNTER — TELEPHONE (OUTPATIENT)
Dept: ANTICOAGULATION | Facility: CLINIC | Age: 30
End: 2020-07-20

## 2020-07-20 NOTE — TELEPHONE ENCOUNTER
Pt overdue for INR check     Fonalityt message sent advising him to make an appt    Francesca Persaud RN

## 2020-09-22 ENCOUNTER — HOSPITAL ENCOUNTER (EMERGENCY)
Facility: CLINIC | Age: 30
Discharge: HOME | End: 2020-09-22
Attending: FAMILY MEDICINE | Admitting: FAMILY MEDICINE
Payer: COMMERCIAL

## 2020-09-22 VITALS
RESPIRATION RATE: 20 BRPM | OXYGEN SATURATION: 99 % | TEMPERATURE: 97.3 F | SYSTOLIC BLOOD PRESSURE: 127 MMHG | HEART RATE: 60 BPM | DIASTOLIC BLOOD PRESSURE: 60 MMHG

## 2020-09-22 DIAGNOSIS — K21.00 GASTROESOPHAGEAL REFLUX DISEASE WITH ESOPHAGITIS: ICD-10-CM

## 2020-09-22 LAB
ALBUMIN SERPL-MCNC: 4 G/DL (ref 3.4–5)
ALP SERPL-CCNC: 96 U/L (ref 40–150)
ALT SERPL W P-5'-P-CCNC: 34 U/L (ref 0–70)
ANION GAP SERPL CALCULATED.3IONS-SCNC: 5 MMOL/L (ref 3–14)
AST SERPL W P-5'-P-CCNC: 20 U/L (ref 0–45)
BASOPHILS # BLD AUTO: 0.1 10E9/L (ref 0–0.2)
BASOPHILS NFR BLD AUTO: 0.8 %
BILIRUB SERPL-MCNC: 0.6 MG/DL (ref 0.2–1.3)
BUN SERPL-MCNC: 16 MG/DL (ref 7–30)
CALCIUM SERPL-MCNC: 9.1 MG/DL (ref 8.5–10.1)
CHLORIDE SERPL-SCNC: 108 MMOL/L (ref 94–109)
CO2 SERPL-SCNC: 28 MMOL/L (ref 20–32)
CREAT SERPL-MCNC: 0.96 MG/DL (ref 0.66–1.25)
DIFFERENTIAL METHOD BLD: NORMAL
EOSINOPHIL NFR BLD AUTO: 3.5 %
ERYTHROCYTE [DISTWIDTH] IN BLOOD BY AUTOMATED COUNT: 12 % (ref 10–15)
GFR SERPL CREATININE-BSD FRML MDRD: >90 ML/MIN/{1.73_M2}
GLUCOSE SERPL-MCNC: 89 MG/DL (ref 70–99)
HCT VFR BLD AUTO: 43.1 % (ref 40–53)
HGB BLD-MCNC: 14.8 G/DL (ref 13.3–17.7)
IMM GRANULOCYTES # BLD: 0 10E9/L (ref 0–0.4)
IMM GRANULOCYTES NFR BLD: 0.2 %
LIPASE SERPL-CCNC: 94 U/L (ref 73–393)
LYMPHOCYTES # BLD AUTO: 2.8 10E9/L (ref 0.8–5.3)
LYMPHOCYTES NFR BLD AUTO: 33.4 %
MCH RBC QN AUTO: 29.2 PG (ref 26.5–33)
MCHC RBC AUTO-ENTMCNC: 34.3 G/DL (ref 31.5–36.5)
MCV RBC AUTO: 85 FL (ref 78–100)
MONOCYTES # BLD AUTO: 0.8 10E9/L (ref 0–1.3)
MONOCYTES NFR BLD AUTO: 9 %
NEUTROPHILS # BLD AUTO: 4.5 10E9/L (ref 1.6–8.3)
NEUTROPHILS NFR BLD AUTO: 53.1 %
NRBC # BLD AUTO: 0 10*3/UL
NRBC BLD AUTO-RTO: 0 /100
PLATELET # BLD AUTO: 250 10E9/L (ref 150–450)
POTASSIUM SERPL-SCNC: 3.8 MMOL/L (ref 3.4–5.3)
PROT SERPL-MCNC: 7.5 G/DL (ref 6.8–8.8)
RBC # BLD AUTO: 5.07 10E12/L (ref 4.4–5.9)
SODIUM SERPL-SCNC: 141 MMOL/L (ref 133–144)
TROPONIN I SERPL-MCNC: <0.015 UG/L (ref 0–0.04)
WBC # BLD AUTO: 8.5 10E9/L (ref 4–11)

## 2020-09-22 PROCEDURE — 99284 EMERGENCY DEPT VISIT MOD MDM: CPT | Mod: Z6 | Performed by: FAMILY MEDICINE

## 2020-09-22 PROCEDURE — 84484 ASSAY OF TROPONIN QUANT: CPT | Performed by: FAMILY MEDICINE

## 2020-09-22 PROCEDURE — 25000132 ZZH RX MED GY IP 250 OP 250 PS 637: Performed by: FAMILY MEDICINE

## 2020-09-22 PROCEDURE — 25000125 ZZHC RX 250: Performed by: FAMILY MEDICINE

## 2020-09-22 PROCEDURE — 83690 ASSAY OF LIPASE: CPT | Performed by: FAMILY MEDICINE

## 2020-09-22 PROCEDURE — 85025 COMPLETE CBC W/AUTO DIFF WBC: CPT | Performed by: FAMILY MEDICINE

## 2020-09-22 PROCEDURE — 80053 COMPREHEN METABOLIC PANEL: CPT | Performed by: FAMILY MEDICINE

## 2020-09-22 PROCEDURE — 99284 EMERGENCY DEPT VISIT MOD MDM: CPT | Performed by: FAMILY MEDICINE

## 2020-09-22 PROCEDURE — 93005 ELECTROCARDIOGRAM TRACING: CPT | Performed by: FAMILY MEDICINE

## 2020-09-22 RX ADMIN — OMEPRAZOLE 20 MG: 20 CAPSULE, DELAYED RELEASE ORAL at 22:28

## 2020-09-22 RX ADMIN — LIDOCAINE HYDROCHLORIDE 30 ML: 20 SOLUTION ORAL; TOPICAL at 21:37

## 2020-09-22 NOTE — ED AVS SNAPSHOT
[facility]  [address] [place] [address]  Phone:  [phone]  Fax:  [phone]                                    Burton Pillai   MRN: [id number]    Department:  [facility]   Date of Visit:  9/22/2020           After Visit Summary Signature Page    I have received my discharge instructions, and my questions have been answered. I have discussed any challenges I see with this plan with the nurse or doctor.    ..........................................................................................................................................  Patient/Patient Representative Signature      ..........................................................................................................................................  Patient Representative Print Name and Relationship to Patient    ..................................................               ................................................  Date                                   Time    ..........................................................................................................................................  Reviewed by Signature/Title    ...................................................              ..............................................  Date                                               Time          22EPIC Rev 08/18

## 2020-09-23 NOTE — ED PROVIDER NOTES
"  History     Chief Complaint   Patient presents with     Chest Pain     The history is provided by the patient.     Burton Pillai is a 29 year old male who presents to the emergency department with concerns of chest pain onset three days ago. The patient states that his pain is located in his \"sternal\" area. IT has been intermittent over the past few days, but today it is coming on more than it has been. He says that it is still intermittent and will come on for about 10-15 minutes then subside on its' own. This happens a few times within every hour. It is not worsened with certain movements and comes on randomly. The pain has not woken him up from sleeping. Today the patient has felt nauseated, but he has not vomited. The pain does not radiate into his abdomen or back. He notes feeling lightheaded, dizzy and \"spacey\", but says this resolved on its' own prior to visiting the ED. He has not experienced anything similar before. No history of GERD or ulcers.     Allergies:  Allergies   Allergen Reactions     No Known Drug Allergies        Problem List:    Patient Active Problem List    Diagnosis Date Noted     Other pulmonary embolism without acute cor pulmonale (H) 12/20/2019     Priority: Medium     Other acute pulmonary embolism without acute cor pulmonale (H) 12/20/2019     Priority: Medium     Closed displaced transverse fracture of shaft of left femur (H) 12/14/2019     Priority: Medium     Added automatically from request for surgery [id number]       Acne 08/19/2010     Priority: Medium        Past Medical History:    No past medical history on file.    Past Surgical History:    No past surgical history on file.    Family History:    Family History   Problem Relation Age of Onset     Diabetes Father      Hypertension Father      Heart Disease Father      Breast Cancer Maternal Grandmother      Lipids Maternal Grandmother      Thyroid Disease Paternal Grandmother        Social History:  Marital Status:  "  [2]  Social History     Tobacco Use     Smoking status: Passive Smoke Exposure - Never Smoker     Smokeless tobacco: Never Used     Tobacco comment: parents   Substance Use Topics     Alcohol use: Yes     Alcohol/week: 5.0 standard drinks     Types: 6 Cans of beer per week     Drug use: No        Medications:    acetaminophen (TYLENOL) 500 MG tablet  polyethylene glycol (MIRALAX/GLYCOLAX) packet          Review of Systems   All other systems reviewed and are negative.      Physical Exam   BP: 127/60  Pulse: 60  Temp: 97.3  F (36.3  C)  Resp: 20  SpO2: 99 %      Physical Exam  Vitals signs and nursing note reviewed.   Constitutional:       General: He is not in acute distress.     Appearance: Normal appearance. He is not diaphoretic.   HENT:      Head: Normocephalic and atraumatic.      Right Ear: External ear normal.      Left Ear: External ear normal.      Mouth/Throat:      Mouth: Mucous membranes are moist.   Eyes:      Extraocular Movements: Extraocular movements intact.      Conjunctiva/sclera: Conjunctivae normal.      Pupils: Pupils are equal, round, and reactive to light.   Neck:      Musculoskeletal: Normal range of motion and neck supple. No neck rigidity.   Cardiovascular:      Rate and Rhythm: Normal rate and regular rhythm.      Heart sounds: Normal heart sounds.   Pulmonary:      Effort: Pulmonary effort is normal. No respiratory distress.   Abdominal:      Tenderness: There is abdominal tenderness in the right upper quadrant.   Musculoskeletal: Normal range of motion.         General: No deformity or signs of injury.   Skin:     General: Skin is dry.      Findings: No rash.   Neurological:      Mental Status: He is alert and oriented to person, place, and time.   Psychiatric:         Behavior: Behavior normal.         Thought Content: Thought content normal.         ED Course        Procedures        Results for orders placed or performed during the hospital encounter of 09/22/20 (from the  past 24 hour(s))   CBC with platelets differential   Result Value Ref Range    WBC 8.5 4.0 - 11.0 10e9/L    RBC Count 5.07 4.4 - 5.9 10e12/L    Hemoglobin 14.8 13.3 - 17.7 g/dL    Hematocrit 43.1 40.0 - 53.0 %    MCV 85 78 - 100 fl    MCH 29.2 26.5 - 33.0 pg    MCHC 34.3 31.5 - 36.5 g/dL    RDW 12.0 10.0 - 15.0 %    Platelet Count 250 150 - 450 10e9/L    Diff Method Automated Method     % Neutrophils 53.1 %    % Lymphocytes 33.4 %    % Monocytes 9.0 %    % Eosinophils 3.5 %    % Basophils 0.8 %    % Immature Granulocytes 0.2 %    Nucleated RBCs 0 0 /100    Absolute Neutrophil 4.5 1.6 - 8.3 10e9/L    Absolute Lymphocytes 2.8 0.8 - 5.3 10e9/L    Absolute Monocytes 0.8 0.0 - 1.3 10e9/L    Absolute Basophils 0.1 0.0 - 0.2 10e9/L    Abs Immature Granulocytes 0.0 0 - 0.4 10e9/L    Absolute Nucleated RBC 0.0    Comprehensive metabolic panel   Result Value Ref Range    Sodium 141 133 - 144 mmol/L    Potassium 3.8 3.4 - 5.3 mmol/L    Chloride 108 94 - 109 mmol/L    Carbon Dioxide 28 20 - 32 mmol/L    Anion Gap 5 3 - 14 mmol/L    Glucose 89 70 - 99 mg/dL    Urea Nitrogen 16 7 - 30 mg/dL    Creatinine 0.96 0.66 - 1.25 mg/dL    GFR Estimate >90 >60 mL/min/[1.73_m2]    GFR Estimate If Black >90 >60 mL/min/[1.73_m2]    Calcium 9.1 8.5 - 10.1 mg/dL    Bilirubin Total 0.6 0.2 - 1.3 mg/dL    Albumin 4.0 3.4 - 5.0 g/dL    Protein Total 7.5 6.8 - 8.8 g/dL    Alkaline Phosphatase 96 40 - 150 U/L    ALT 34 0 - 70 U/L    AST 20 0 - 45 U/L   Lipase   Result Value Ref Range    Lipase 94 73 - 393 U/L   Troponin I   Result Value Ref Range    Troponin I ES <0.015 0.000 - 0.045 ug/L       Medications   lidocaine (XYLOCAINE) 2 % 15 mL, alum & mag hydroxide-simethicone (MAALOX  ES) 15 mL GI Cocktail (30 mLs Oral Given 9/22/20 2137)     Labs are reviewed and were unremarkable.  Patient did get some relief from the GI cocktail.  I think based on the symptoms being intermittent like this and coming and going so quickly, this is likely GERD.   Suspect a PE as the pain is not constant and there is no shortness of breath.  Will treat with a course of Prilosec daily for the next 2 weeks to see if this helps.  Patient will follow-up with her doctor if there is no improvement by the next 5 to 7 days    Assessments & Plan (with Medical Decision Making)  GERD.       I have reviewed the nursing notes.    I have reviewed the findings, diagnosis, plan and need for follow up with the patient.          Final diagnoses:   Gastroesophageal reflux disease with esophagitis       This document serves as a record of services personally performed by Stevie Gonzalez MD*. It was created on their behalf by Enedina Odom, a trained medical scribe. The creation of this record is based on the provider's personal observations and the statements of the patient. This document has been checked and approved by the attending provider.  Note: Chart documentation done in part with Dragon Voice Recognition software. Although reviewed after completion, some word and grammatical errors may remain.  9/22/2020   [facility] EMERGENCY DEPARTMENT     Stevie Gonzalez MD  09/22/20 4499

## 2020-09-24 ENCOUNTER — APPOINTMENT (OUTPATIENT)
Dept: ULTRASOUND IMAGING | Facility: CLINIC | Age: 30
End: 2020-09-24
Attending: EMERGENCY MEDICINE
Payer: COMMERCIAL

## 2020-09-24 ENCOUNTER — HOSPITAL ENCOUNTER (EMERGENCY)
Facility: CLINIC | Age: 30
Discharge: HOME | End: 2020-09-24
Attending: FAMILY MEDICINE | Admitting: FAMILY MEDICINE
Payer: COMMERCIAL

## 2020-09-24 ENCOUNTER — APPOINTMENT (OUTPATIENT)
Dept: GENERAL RADIOLOGY | Facility: CLINIC | Age: 30
End: 2020-09-24
Attending: FAMILY MEDICINE
Payer: COMMERCIAL

## 2020-09-24 ENCOUNTER — ANTICOAGULATION THERAPY VISIT (OUTPATIENT)
Dept: ANTICOAGULATION | Facility: OTHER | Age: 30
End: 2020-09-24

## 2020-09-24 VITALS
DIASTOLIC BLOOD PRESSURE: 77 MMHG | TEMPERATURE: 97.8 F | HEART RATE: 46 BPM | SYSTOLIC BLOOD PRESSURE: 116 MMHG | OXYGEN SATURATION: 98 % | WEIGHT: 195 LBS | BODY MASS INDEX: 25.16 KG/M2 | RESPIRATION RATE: 19 BRPM

## 2020-09-24 DIAGNOSIS — R10.13 EPIGASTRIC PAIN: ICD-10-CM

## 2020-09-24 DIAGNOSIS — R07.9 CHEST PAIN, UNSPECIFIED TYPE: ICD-10-CM

## 2020-09-24 DIAGNOSIS — I26.99 OTHER ACUTE PULMONARY EMBOLISM WITHOUT ACUTE COR PULMONALE (H): ICD-10-CM

## 2020-09-24 LAB
ALBUMIN SERPL-MCNC: 3.8 G/DL (ref 3.4–5)
ALP SERPL-CCNC: 95 U/L (ref 40–150)
ALT SERPL W P-5'-P-CCNC: 32 U/L (ref 0–70)
ANION GAP SERPL CALCULATED.3IONS-SCNC: 3 MMOL/L (ref 3–14)
AST SERPL W P-5'-P-CCNC: 17 U/L (ref 0–45)
BASOPHILS # BLD AUTO: 0.1 10E9/L (ref 0–0.2)
BASOPHILS NFR BLD AUTO: 0.8 %
BILIRUB SERPL-MCNC: 0.5 MG/DL (ref 0.2–1.3)
BUN SERPL-MCNC: 11 MG/DL (ref 7–30)
CALCIUM SERPL-MCNC: 8.9 MG/DL (ref 8.5–10.1)
CHLORIDE SERPL-SCNC: 108 MMOL/L (ref 94–109)
CO2 SERPL-SCNC: 29 MMOL/L (ref 20–32)
CREAT SERPL-MCNC: 0.92 MG/DL (ref 0.66–1.25)
D DIMER PPP FEU-MCNC: 0.3 UG/ML FEU (ref 0–0.5)
DIFFERENTIAL METHOD BLD: NORMAL
EOSINOPHIL NFR BLD AUTO: 4.4 %
ERYTHROCYTE [DISTWIDTH] IN BLOOD BY AUTOMATED COUNT: 11.9 % (ref 10–15)
GFR SERPL CREATININE-BSD FRML MDRD: >90 ML/MIN/{1.73_M2}
GLUCOSE SERPL-MCNC: 109 MG/DL (ref 70–99)
HCT VFR BLD AUTO: 43.1 % (ref 40–53)
HGB BLD-MCNC: 14.8 G/DL (ref 13.3–17.7)
IMM GRANULOCYTES # BLD: 0 10E9/L (ref 0–0.4)
IMM GRANULOCYTES NFR BLD: 0.3 %
INR PPP: 1.03 (ref 0.86–1.14)
LIPASE SERPL-CCNC: 103 U/L (ref 73–393)
LYMPHOCYTES # BLD AUTO: 2.3 10E9/L (ref 0.8–5.3)
LYMPHOCYTES NFR BLD AUTO: 35.8 %
MCH RBC QN AUTO: 29.5 PG (ref 26.5–33)
MCHC RBC AUTO-ENTMCNC: 34.3 G/DL (ref 31.5–36.5)
MCV RBC AUTO: 86 FL (ref 78–100)
MONOCYTES # BLD AUTO: 0.6 10E9/L (ref 0–1.3)
MONOCYTES NFR BLD AUTO: 9.5 %
NEUTROPHILS # BLD AUTO: 3.2 10E9/L (ref 1.6–8.3)
NEUTROPHILS NFR BLD AUTO: 49.2 %
NRBC # BLD AUTO: 0 10*3/UL
NRBC BLD AUTO-RTO: 0 /100
PLATELET # BLD AUTO: 219 10E9/L (ref 150–450)
POTASSIUM SERPL-SCNC: 4.2 MMOL/L (ref 3.4–5.3)
PROT SERPL-MCNC: 7.3 G/DL (ref 6.8–8.8)
RBC # BLD AUTO: 5.02 10E12/L (ref 4.4–5.9)
SODIUM SERPL-SCNC: 140 MMOL/L (ref 133–144)
TROPONIN I SERPL-MCNC: <0.015 UG/L (ref 0–0.04)
WBC # BLD AUTO: 6.5 10E9/L (ref 4–11)

## 2020-09-24 PROCEDURE — 93005 ELECTROCARDIOGRAM TRACING: CPT | Performed by: FAMILY MEDICINE

## 2020-09-24 PROCEDURE — 85379 FIBRIN DEGRADATION QUANT: CPT | Performed by: FAMILY MEDICINE

## 2020-09-24 PROCEDURE — 84484 ASSAY OF TROPONIN QUANT: CPT | Performed by: FAMILY MEDICINE

## 2020-09-24 PROCEDURE — 96372 THER/PROPH/DIAG INJ SC/IM: CPT | Performed by: FAMILY MEDICINE

## 2020-09-24 PROCEDURE — 93010 ELECTROCARDIOGRAM REPORT: CPT | Mod: Z6 | Performed by: FAMILY MEDICINE

## 2020-09-24 PROCEDURE — 76705 ECHO EXAM OF ABDOMEN: CPT

## 2020-09-24 PROCEDURE — 36415 COLL VENOUS BLD VENIPUNCTURE: CPT | Performed by: FAMILY MEDICINE

## 2020-09-24 PROCEDURE — 83690 ASSAY OF LIPASE: CPT | Performed by: FAMILY MEDICINE

## 2020-09-24 PROCEDURE — 99285 EMERGENCY DEPT VISIT HI MDM: CPT | Mod: 25 | Performed by: FAMILY MEDICINE

## 2020-09-24 PROCEDURE — 25000128 H RX IP 250 OP 636: Performed by: EMERGENCY MEDICINE

## 2020-09-24 PROCEDURE — 80053 COMPREHEN METABOLIC PANEL: CPT | Performed by: FAMILY MEDICINE

## 2020-09-24 PROCEDURE — 85610 PROTHROMBIN TIME: CPT | Performed by: FAMILY MEDICINE

## 2020-09-24 PROCEDURE — 25000132 ZZH RX MED GY IP 250 OP 250 PS 637: Performed by: FAMILY MEDICINE

## 2020-09-24 PROCEDURE — 85025 COMPLETE CBC W/AUTO DIFF WBC: CPT | Performed by: FAMILY MEDICINE

## 2020-09-24 PROCEDURE — 71046 X-RAY EXAM CHEST 2 VIEWS: CPT | Mod: TC

## 2020-09-24 RX ORDER — SUCRALFATE ORAL 1 G/10ML
1 SUSPENSION ORAL ONCE
Status: COMPLETED | OUTPATIENT
Start: 2020-09-24 | End: 2020-09-24

## 2020-09-24 RX ADMIN — HYDROMORPHONE HYDROCHLORIDE 1 MG: 1 INJECTION, SOLUTION INTRAMUSCULAR; INTRAVENOUS; SUBCUTANEOUS at 08:22

## 2020-09-24 RX ADMIN — SUCRALFATE 1 G: 1 SUSPENSION ORAL at 07:13

## 2020-09-24 NOTE — DISCHARGE INSTRUCTIONS
All of your labs and ultrasound were normal.    I think this most likely is pain coming from your stomach and I do recommend an endoscopy in the future.    Start the omeprazole as prescribed.  This is to suppress your stomach acid.  You can add over-the-counter medication such as ranitidine, famotidine, or Tums if needed.    Please make an appointment to see a primary care provider and get scheduled for an endoscopy.    Return for significant worsening, changes or concerns.    I hope that you improve quickly!!

## 2020-09-24 NOTE — ED PROVIDER NOTES
[facility] ED Provider Note   Patient: Burton Pillai  MRN #:  [id number]  Date of Visit: September 24, 2020    CC:     Chief Complaint   Patient presents with     Chest Pain     HPI:  Burton Pillai is a 29 year old male who presented to the emergency department with acute recurrent chest pains that started at 5:00 this morning when he woke up.  Patient went back to bed and was awakened 1/2-hour later and noticed that his pain was becoming more intense and more frequent.  These episodes were becoming sharper and lasting longer.  Patient was in the emergency department 2 days ago with complaints of intermittent chest pains.  He found some relief with GI cocktail but still had some mild symptoms.  He has not started his omeprazole.  Patient states that the pain is in the midsternal region without radiation into the back, neck, jaw or arms.  He does not have any associated shortness of breath, nausea, or diaphoresis.  He has a history of pulmonary embolism last December after he had a femur fracture.  He was on Coumadin up until a few months ago.  Patient does not feel short of breath like he did with his PE, and he has not had any recent unilateral calf pain or ankle swelling.  Cardiac risk factors include a strong family history of premature heart disease.  The patient's brother had a heart attack at age 34.  He has no personal history of diabetes, hypertension or hyperlipidemia.  He does not smoke, and only drinks socially a couple of times a month.      Problem List:  Patient Active Problem List    Diagnosis Date Noted     Other pulmonary embolism without acute cor pulmonale (H) 12/20/2019     Priority: Medium     Other acute pulmonary embolism without acute cor pulmonale (H) 12/20/2019     Priority: Medium     Closed displaced transverse fracture of shaft of left femur (H) 12/14/2019     Priority: Medium     Added automatically from  request for surgery [id number]       Acne 08/19/2010     Priority: Medium       No past medical history on file.    MEDS: omeprazole (PRILOSEC) 20 MG DR capsule        ALLERGIES:    Allergies   Allergen Reactions     No Known Drug Allergies        No past surgical history on file.    Social History     Tobacco Use     Smoking status: Passive Smoke Exposure - Never Smoker     Smokeless tobacco: Never Used     Tobacco comment: parents   Substance Use Topics     Alcohol use: Yes     Alcohol/week: 5.0 standard drinks     Types: 6 Cans of beer per week     Drug use: No         Review of Systems   Except as noted in HPI, all other systems were reviewed and are negative    Physical Exam     Vitals were reviewed  Patient Vitals for the past 12 hrs:   BP Temp Temp src Pulse Resp SpO2 Weight   09/24/20 0644 132/79 97.8  F (36.6  C) Oral 52 16 98 % 88.5 kg (195 lb)     GENERAL APPEARANCE: Alert and oriented x3, no apparent distress  FACE: normal facies  EYES: Pupils are equal: No scleral icterus  HENT: normal external exam  NECK: no adenopathy or asymmetry  CHEST: No reproducible chest wall tenderness  RESP: normal respiratory effort; clear breath sounds bilaterally  CV: regular rate and rhythm; no significant murmurs, gallops or rubs  ABD: soft, mild to moderate epigastric tenderness; no rebound or guarding; bowel sounds are normal  MS: no gross deformities noted; normal muscle tone.  EXT: No calf tenderness or pitting edema  SKIN: no worrisome rash  NEURO: no facial droop; no focal deficits, speech is normal  PSYCH: normal mood and affect      Available Lab/Imaging Results   No results found for this or any previous visit (from the past 24 hour(s)).    EKG reviewed by me: Sinus bradycardia with heart rate of 48.  PA interval of 168 ms, QT interval of 418 ms, QRS duration of 92 ms.  Normal axis.  No acute ST-T wave changes.  Impression: Marked sinus bradycardia.  No acute ischemic changes.           Impression     Final  diagnoses:   Chest pain         ED Course & Medical Decision Making   Burton Pillai is a 29 year old male who presented to the emergency department with acute recurrent midsternal chest pain that awaken the patient this morning.  Episodes are occurring more frequently and more intense than the had several days ago.  He was seen in emergency department 2 days ago and diagnosed with probable GERD with esophagitis.  He was supposed to start omeprazole but he had not started it yet.  Patient's cardiac risk factors include to family history of premature heart disease but no other risk factors.  Vital signs reveal a temp of 97.8, blood pressure 132/79, heart rate of 52, respiratory rate of 16 with 98% oxygen saturation.  EKG reveals marked sinus bradycardia but no acute ST-T wave changes.  Exam was otherwise unremarkable.  Patient has a prior history of PE.  We will recheck a d-dimer, troponin, and chest x-ray.  Patient received a dose of Carafate suspension.  Patient was signed out to Dr. Flowers at the change of shift.        Disclaimer: This note consists of words and symbols derived from keyboarding and dictation using voice recognition software.  As a result, there may be errors that have gone undetected.  Please consider this when interpreting information found in this note.       Moises Garnica MD  09/24/20 0707

## 2020-09-24 NOTE — ED PROVIDER NOTES
SIGN OUT NOTE    Patient signed out to me at 7 AM by Dr. Garnica.  This is a 29-year-old male presenting with chest/epigastric pain.  Patient was already seen in the ED 2 days ago and diagnosed with likely gastritis symptoms.  He was started on omeprazole but had not started it.  Because he has history of a PE when he had a femur fracture in the past, plan was to check labs including d-dimer and troponin.  Patient has been given Carafate already.    Results for orders placed or performed during the hospital encounter of 09/24/20   XR Chest 2 Views     Status: None    Narrative    CHEST TWO VIEWS September 24, 2020 7:39 AM     HISTORY: Chest pains.    COMPARISON: Chest x-rays dated 3/1/2014.    FINDINGS: The lungs are clear. No pleural effusions or pneumothorax.  Heart size and pulmonary vascularity are within normal limits. No  acute fracture.       Impression    IMPRESSION: No evidence of acute cardiopulmonary disease is seen.    WILLIAMS MAYER MD   Abdomen US, limited (RUQ only)     Status: None    Narrative    ULTRASOUND ABDOMEN LIMITED 9/24/2020 8:49 AM    CLINICAL HISTORY: Epigastric pain.    TECHNIQUE: Limited abdominal ultrasound.    COMPARISON: CT abdomen and pelvis dated 8/5/2016.    FINDINGS:    GALLBLADDER: The gallbladder is normal. No gallstones, wall  thickening, or pericholecystic fluid. Negative sonographic Simms's  sign.    BILE DUCTS: There is no biliary dilatation. The common duct measures 3  mm.    LIVER: There is diffuse hepatic hyperechogenicity consistent with  diffuse fatty infiltration. No focal intrahepatic lesion or biliary  ductal dilatation.    RIGHT KIDNEY: No hydronephrosis.    PANCREAS: The visualized portions of the pancreas are normal. Tail and  distal body are obscured by bowel gas.    ABDOMINAL AORTA AND INFERIOR VENA CAVA: Visualized portions of these  structures are grossly of normal caliber.    No ascites.      Impression    IMPRESSION:  1.  Diffuse fatty infiltration of the  liver.  2.  No evidence for cholelithiasis or acute cholecystitis.  3.  Limited evaluation of the pancreas and abdominal aorta. No obvious  abnormality of these structures is seen.    WILLIAMS MAYER MD   CBC with platelets differential     Status: None   Result Value Ref Range    WBC 6.5 4.0 - 11.0 10e9/L    RBC Count 5.02 4.4 - 5.9 10e12/L    Hemoglobin 14.8 13.3 - 17.7 g/dL    Hematocrit 43.1 40.0 - 53.0 %    MCV 86 78 - 100 fl    MCH 29.5 26.5 - 33.0 pg    MCHC 34.3 31.5 - 36.5 g/dL    RDW 11.9 10.0 - 15.0 %    Platelet Count 219 150 - 450 10e9/L    Diff Method Automated Method     % Neutrophils 49.2 %    % Lymphocytes 35.8 %    % Monocytes 9.5 %    % Eosinophils 4.4 %    % Basophils 0.8 %    % Immature Granulocytes 0.3 %    Nucleated RBCs 0 0 /100    Absolute Neutrophil 3.2 1.6 - 8.3 10e9/L    Absolute Lymphocytes 2.3 0.8 - 5.3 10e9/L    Absolute Monocytes 0.6 0.0 - 1.3 10e9/L    Absolute Basophils 0.1 0.0 - 0.2 10e9/L    Abs Immature Granulocytes 0.0 0 - 0.4 10e9/L    Absolute Nucleated RBC 0.0    Comprehensive metabolic panel     Status: Abnormal   Result Value Ref Range    Sodium 140 133 - 144 mmol/L    Potassium 4.2 3.4 - 5.3 mmol/L    Chloride 108 94 - 109 mmol/L    Carbon Dioxide 29 20 - 32 mmol/L    Anion Gap 3 3 - 14 mmol/L    Glucose 109 (H) 70 - 99 mg/dL    Urea Nitrogen 11 7 - 30 mg/dL    Creatinine 0.92 0.66 - 1.25 mg/dL    GFR Estimate >90 >60 mL/min/[1.73_m2]    GFR Estimate If Black >90 >60 mL/min/[1.73_m2]    Calcium 8.9 8.5 - 10.1 mg/dL    Bilirubin Total 0.5 0.2 - 1.3 mg/dL    Albumin 3.8 3.4 - 5.0 g/dL    Protein Total 7.3 6.8 - 8.8 g/dL    Alkaline Phosphatase 95 40 - 150 U/L    ALT 32 0 - 70 U/L    AST 17 0 - 45 U/L   D dimer quantitative     Status: None   Result Value Ref Range    D Dimer 0.3 0.0 - 0.50 ug/ml FEU   INR     Status: None   Result Value Ref Range    INR 1.03 0.86 - 1.14   Lipase     Status: None   Result Value Ref Range    Lipase 103 73 - 393 U/L   Troponin I     Status: None    Result Value Ref Range    Troponin I ES <0.015 0.000 - 0.045 ug/L      Patient's labs are all normal.  I reexamined him and noted that his pain is primarily epigastric.  I have elected to do an ultrasound to be sure he does not have any obvious gallbladder disease.  He was given IV pain medication.    Patient had relief of his pain with IV pain medication.  Ultrasound shows diffuse fatty liver, no cholelithiasis/cholecystitis.    I discussed all these results with the patient.  I recommend he start his omeprazole.  I also recommend that he see a primary care provider and have a referral for an endoscopy.  He can use other over-the-counter GI medications such as Tums, Zantac, etc. as desired.  Return at anytime for worsening, changes or concerns.    (R07.9) Chest pain    (R10.13) Epigastric pain       Ofe Flowers MD  09/25/20 0151

## 2020-09-24 NOTE — ED AVS SNAPSHOT
[facility] Emergency Department  [address] [place] [address]  Phone:  [phone]  Fax:  [phone]                                    Burton Pillai   MRN: [id number]    Department:  [facility] Emergency Department   Date of Visit:  9/24/2020           After Visit Summary Signature Page    I have received my discharge instructions, and my questions have been answered. I have discussed any challenges I see with this plan with the nurse or doctor.    ..........................................................................................................................................  Patient/Patient Representative Signature      ..........................................................................................................................................  Patient Representative Print Name and Relationship to Patient    ..................................................               ................................................  Date                                   Time    ..........................................................................................................................................  Reviewed by Signature/Title    ...................................................              ..............................................  Date                                               Time          22EPIC Rev 08/18

## 2020-09-29 ENCOUNTER — MYC MEDICAL ADVICE (OUTPATIENT)
Dept: FAMILY MEDICINE | Facility: CLINIC | Age: 30
End: 2020-09-29

## 2020-10-27 NOTE — PROGRESS NOTES
Outpatient Physical Therapy Discharge Note     Patient: Burton Pillai  : 1990    Beginning/End Dates of Reporting Period:  1/15/2020 to 3/19/2020  Maverick has been seen for a total of 14 visits overall.    Referring Provider: Dr. Marky Owens    Therapy Diagnosis: S/P nailing of femur fracture with weakness and decreased function     Client Self Report: Maverick states that he was okayed to go back to work so went back earlier this week. He states that his leg is healing well.     Objective Measurements:  Objective Measure: PROM/AROM  Details: 0 extension to 135 flexion. Hamstring, supine femur perpendicular knee at 135.     Objective Measure: Quad activation  Details: Able to lift 5# with SLR and 7.5# SAQ     Goals:  Goal Identifier Return to work   Goal Description Pt will report return to work with full duties in order to fulfill financial obligations   Target Date 20   Date Met  20   Progress:     Goal Identifier LEFS   Goal Description Pt will improve LEFS score by at least 9 in order to show improvement in overall functional movement.    Target Date 20   Date Met      Progress:                                    Meeting goals     Progress Toward Goals:   Progress this reporting period: Maverick has made good progress, met his first 2 goals, continues to work toward 3rd goal.     Plan:  Discharge from therapy.    Discharge: Yes    Reason for Discharge: Independent with home program, returned to work.     Equipment Issued: Theraband    Discharge Plan: Patient to continue home program.

## 2020-12-06 ENCOUNTER — HEALTH MAINTENANCE LETTER (OUTPATIENT)
Age: 30
End: 2020-12-06

## 2021-08-18 ENCOUNTER — OFFICE VISIT (OUTPATIENT)
Dept: INTERNAL MEDICINE | Facility: CLINIC | Age: 31
End: 2021-08-18
Payer: COMMERCIAL

## 2021-08-18 VITALS
BODY MASS INDEX: 25.45 KG/M2 | TEMPERATURE: 97.8 F | WEIGHT: 192 LBS | DIASTOLIC BLOOD PRESSURE: 84 MMHG | HEART RATE: 68 BPM | RESPIRATION RATE: 16 BRPM | SYSTOLIC BLOOD PRESSURE: 124 MMHG | HEIGHT: 73 IN | OXYGEN SATURATION: 99 %

## 2021-08-18 DIAGNOSIS — Z11.59 NEED FOR HEPATITIS C SCREENING TEST: ICD-10-CM

## 2021-08-18 DIAGNOSIS — Z82.49 FAMILY HISTORY OF ASCVD: ICD-10-CM

## 2021-08-18 DIAGNOSIS — Z00.00 ENCOUNTER FOR ROUTINE ADULT HEALTH EXAMINATION WITHOUT ABNORMAL FINDINGS: Primary | ICD-10-CM

## 2021-08-18 DIAGNOSIS — Z11.4 ENCOUNTER FOR SCREENING FOR HIV: ICD-10-CM

## 2021-08-18 LAB
ALBUMIN SERPL-MCNC: 4.2 G/DL (ref 3.4–5)
ALP SERPL-CCNC: 82 U/L (ref 40–150)
ALT SERPL W P-5'-P-CCNC: 36 U/L (ref 0–70)
ANION GAP SERPL CALCULATED.3IONS-SCNC: 3 MMOL/L (ref 3–14)
AST SERPL W P-5'-P-CCNC: 17 U/L (ref 0–45)
BILIRUB SERPL-MCNC: 0.5 MG/DL (ref 0.2–1.3)
BUN SERPL-MCNC: 14 MG/DL (ref 7–30)
CALCIUM SERPL-MCNC: 9 MG/DL (ref 8.5–10.1)
CHLORIDE BLD-SCNC: 107 MMOL/L (ref 94–109)
CHOLEST SERPL-MCNC: 223 MG/DL
CO2 SERPL-SCNC: 28 MMOL/L (ref 20–32)
CREAT SERPL-MCNC: 0.92 MG/DL (ref 0.66–1.25)
FASTING STATUS PATIENT QL REPORTED: NO
GFR SERPL CREATININE-BSD FRML MDRD: >90 ML/MIN/1.73M2
GLUCOSE BLD-MCNC: 103 MG/DL (ref 70–99)
HCV AB SERPL QL IA: NONREACTIVE
HDLC SERPL-MCNC: 43 MG/DL
HIV 1+2 AB+HIV1 P24 AG SERPL QL IA: NONREACTIVE
LDLC SERPL CALC-MCNC: 147 MG/DL
NONHDLC SERPL-MCNC: 180 MG/DL
POTASSIUM BLD-SCNC: 4.4 MMOL/L (ref 3.4–5.3)
PROT SERPL-MCNC: 7.9 G/DL (ref 6.8–8.8)
SODIUM SERPL-SCNC: 138 MMOL/L (ref 133–144)
TRIGL SERPL-MCNC: 166 MG/DL

## 2021-08-18 PROCEDURE — 86803 HEPATITIS C AB TEST: CPT | Performed by: INTERNAL MEDICINE

## 2021-08-18 PROCEDURE — 80061 LIPID PANEL: CPT | Performed by: INTERNAL MEDICINE

## 2021-08-18 PROCEDURE — 80053 COMPREHEN METABOLIC PANEL: CPT | Performed by: INTERNAL MEDICINE

## 2021-08-18 PROCEDURE — 36415 COLL VENOUS BLD VENIPUNCTURE: CPT | Performed by: INTERNAL MEDICINE

## 2021-08-18 PROCEDURE — 99395 PREV VISIT EST AGE 18-39: CPT | Performed by: INTERNAL MEDICINE

## 2021-08-18 PROCEDURE — 87389 HIV-1 AG W/HIV-1&-2 AB AG IA: CPT | Performed by: INTERNAL MEDICINE

## 2021-08-18 ASSESSMENT — ENCOUNTER SYMPTOMS
COUGH: 0
CHILLS: 0
SORE THROAT: 0
FREQUENCY: 0
DIARRHEA: 0
DYSURIA: 0
MYALGIAS: 0
HEADACHES: 0
NAUSEA: 0
ABDOMINAL PAIN: 0
ARTHRALGIAS: 0
DIZZINESS: 0
EYE PAIN: 0
SHORTNESS OF BREATH: 0
HEMATOCHEZIA: 0
FEVER: 0
CONSTIPATION: 0
PALPITATIONS: 0
WEAKNESS: 0
NERVOUS/ANXIOUS: 0
JOINT SWELLING: 0
PARESTHESIAS: 0
HEARTBURN: 0
HEMATURIA: 0

## 2021-08-18 ASSESSMENT — MIFFLIN-ST. JEOR: SCORE: 1890.82

## 2021-08-18 ASSESSMENT — PAIN SCALES - GENERAL: PAINLEVEL: NO PAIN (0)

## 2021-08-18 NOTE — PROGRESS NOTES
SUBJECTIVE:   CC: Burton Pillai is an 30 year old male who presents for preventative health visit.     Patient has been advised of split billing requirements and indicates understanding: Yes  Healthy Habits:     Getting at least 3 servings of Calcium per day:  Yes    Bi-annual eye exam:  Yes    Dental care twice a year:  NO    Sleep apnea or symptoms of sleep apnea:  None    Diet:  Regular (no restrictions)    Frequency of exercise:  2-3 days/week    Duration of exercise:  30-45 minutes    Taking medications regularly:  Yes    Medication side effects:  None    PHQ-2 Total Score: 2    Additional concerns today:  Yes    , 6 year old boy and 4 year old girl. Lives in [place]. Works in warehouse, [employer].     Health has been good.      Brother just  from a heart attack at age 43.     Broke his femur snowboarding and got a PE and had coumadin for 6 months.         Today's PHQ-2 Score:   PHQ-2 (  [employer]) 2021   Q1: Little interest or pleasure in doing things 1   Q2: Feeling down, depressed or hopeless 1   PHQ-2 Score 2   Q1: Little interest or pleasure in doing things Several days   Q2: Feeling down, depressed or hopeless Several days   PHQ-2 Score 2       Abuse: Current or Past(Physical, Sexual or Emotional)- No  Do you feel safe in your environment? Yes    Have you ever done Advance Care Planning? (For example, a Health Directive, POLST, or a discussion with a medical provider or your loved ones about your wishes): No, advance care planning information given to patient to review.  Advanced care planning was discussed at today's visit.    Social History     Tobacco Use     Smoking status: Passive Smoke Exposure - Never Smoker     Smokeless tobacco: Never Used     Tobacco comment: parents   Substance Use Topics     Alcohol use: Yes     Alcohol/week: 5.0 standard drinks     Types: 6 Cans of beer per week     If you drink alcohol do you typically have >3 drinks per day or >7 drinks per week?  "No    Alcohol Use 8/18/2021   Prescreen: >3 drinks/day or >7 drinks/week? No       Last PSA: No results found for: PSA    Reviewed orders with patient. Reviewed health maintenance and updated orders accordingly - Yes  Lab work is in process    Reviewed and updated as needed this visit by clinical staff  Tobacco  Allergies  Meds              Reviewed and updated as needed this visit by Provider                    Review of Systems   Constitutional: Negative for chills and fever.   HENT: Negative for congestion, ear pain, hearing loss and sore throat.    Eyes: Negative for pain and visual disturbance.   Respiratory: Negative for cough and shortness of breath.    Cardiovascular: Negative for chest pain, palpitations and peripheral edema.   Gastrointestinal: Negative for abdominal pain, constipation, diarrhea, heartburn, hematochezia and nausea.   Genitourinary: Negative for discharge, dysuria, frequency, genital sores, hematuria, impotence and urgency.   Musculoskeletal: Negative for arthralgias, joint swelling and myalgias.   Skin: Negative for rash.   Neurological: Negative for dizziness, weakness, headaches and paresthesias.   Psychiatric/Behavioral: Negative for mood changes. The patient is not nervous/anxious.          OBJECTIVE:   /84   Pulse 68   Temp 97.8  F (36.6  C) (Temporal)   Resp 16   Ht 1.848 m (6' 0.75\")   Wt 87.1 kg (192 lb)   SpO2 99%   BMI 25.51 kg/m      Physical Exam  GENERAL: healthy, alert and no distress  EYES: Eyes grossly normal to inspection, PERRL and conjunctivae and sclerae normal  HENT: ear canals and TM's normal, nose and mouth without ulcers or lesions  NECK: no adenopathy, no asymmetry, masses, or scars and thyroid normal to palpation  RESP: lungs clear to auscultation - no rales, rhonchi or wheezes  CV: regular rate and rhythm, normal S1 S2, no S3 or S4, no murmur, click or rub, no peripheral edema and peripheral pulses strong  ABDOMEN: soft, nontender, no " "hepatosplenomegaly, no masses and bowel sounds normal  MS: no gross musculoskeletal defects noted, no edema  SKIN: no suspicious lesions or rashes  NEURO: Normal strength and tone, mentation intact and speech normal  PSYCH: mentation appears normal, affect normal/bright    Diagnostic Test Results:  Labs reviewed in Epic    ASSESSMENT/PLAN:       ICD-10-CM    1. Encounter for routine adult health examination without abnormal findings  Z00.00 Lipid panel reflex to direct LDL Fasting     Comprehensive metabolic panel (BMP + Alb, Alk Phos, ALT, AST, Total. Bili, TP)     HIV Antigen Antibody Combo     Lipid panel reflex to direct LDL Fasting     Comprehensive metabolic panel (BMP + Alb, Alk Phos, ALT, AST, Total. Bili, TP)     HIV Antigen Antibody Combo   2. Encounter for screening for HIV  Z11.4    3. Need for hepatitis C screening test  Z11.59 Hepatitis C antibody     Hepatitis C antibody   4. Family history of ASCVD  Z82.49      Patient who is a healthy 30-year-old.  He has a history of a femur fracture and subsequent PE years ago.  Was treated with Coumadin.  He has a family history of heart disease with his father having heart attacks and pacemaker, his brother just  at the age of 43 from a heart attack.    We will follow his blood pressure which is stable, check his cholesterol and treat that if needed.  I did offer him a cardiology consultation but he wanted to hold off on this at this time.      Patient has been advised of split billing requirements and indicates understanding: Yes  COUNSELING:   Reviewed preventive health counseling, as reflected in patient instructions       Regular exercise       Healthy diet/nutrition       Immunizations    Recommended and counseled him on the Covid vaccine but he will hold off for now.        Estimated body mass index is 25.51 kg/m  as calculated from the following:    Height as of this encounter: 1.848 m (6' 0.75\").    Weight as of this encounter: 87.1 kg (192 lb).   "       He reports that he is a non-smoker but has been exposed to tobacco smoke. He has never used smokeless tobacco.      Counseling Resources:  ATP IV Guidelines  Pooled Cohorts Equation Calculator  FRAX Risk Assessment  ICSI Preventive Guidelines  Dietary Guidelines for Americans, 2010  USDA's MyPlate  ASA Prophylaxis  Lung CA Screening    Forest Durant MD  [facility]

## 2021-09-25 ENCOUNTER — HEALTH MAINTENANCE LETTER (OUTPATIENT)
Age: 31
End: 2021-09-25

## 2021-10-06 ENCOUNTER — OFFICE VISIT (OUTPATIENT)
Dept: FAMILY MEDICINE | Facility: CLINIC | Age: 31
End: 2021-10-06

## 2021-10-06 VITALS
WEIGHT: 183.4 LBS | HEIGHT: 73 IN | TEMPERATURE: 98 F | RESPIRATION RATE: 16 BRPM | HEART RATE: 72 BPM | OXYGEN SATURATION: 97 % | BODY MASS INDEX: 24.31 KG/M2 | SYSTOLIC BLOOD PRESSURE: 110 MMHG | DIASTOLIC BLOOD PRESSURE: 62 MMHG

## 2021-10-06 DIAGNOSIS — S63.502S SPRAIN OF LEFT WRIST, SEQUELA: Primary | ICD-10-CM

## 2021-10-06 PROCEDURE — 99213 OFFICE O/P EST LOW 20 MIN: CPT | Performed by: FAMILY MEDICINE

## 2021-10-06 ASSESSMENT — PAIN SCALES - GENERAL: PAINLEVEL: NO PAIN (0)

## 2021-10-06 ASSESSMENT — MIFFLIN-ST. JEOR: SCORE: 1851.81

## 2021-10-06 NOTE — PROGRESS NOTES
"    Assessment & Plan     (Q57.261J) Sprain of left wrist, sequela  (primary encounter diagnosis)  Comment: resolved.  Exam was normal today.  He was released back to work without restriction.      Return if symptoms worsen or fail to improve.    Glenny Patel Mai, MD  [facility] LORENZO Temple is a 30 year old who presents for the following health issues     HPI     Musculoskeletal problem/pain  Onset/Duration: 09/17/21  Description  Location: wrist - left  Joint Swelling: no  Redness: no  Pain: no  Warmth: no  Intensity:  mild  Progression of Symptoms:  improving  Accompanying signs and symptoms:   Fevers: no  Numbness/tingling/weakness: no  History  Trauma to the area: YES-   Recent illness:  no  Previous similar problem: no  Previous evaluation:  YES  Precipitating or alleviating factors:  Aggravating factors include: none  Therapies tried and outcome: ice and Ibuprofen    Burton is here today for follow-up on the left wrist pain that started when he twisted his left wrist when he was lifting a object in the wrong way while at work abbout about a month ago.  He heard a pop with immediate severe pain, was not able to lift anything.  He was seen for it in - no medical record available to reviewed.  Stated that the x-ray and he was treated for wrist sprain conservatively.  Been on work restriction.  Feeling better each day and now he is back to normal - no pain or discomfort with lifting or movement of the wrist.  He would like to release back to work job without restriction.  He has no concern today.   No other concern.    Review of Systems   Constitutional, HEENT, cardiovascular, pulmonary, gi and gu systems are negative, except as otherwise noted.      Objective    /62   Pulse 72   Temp 98  F (36.7  C) (Temporal)   Resp 16   Ht 1.848 m (6' 0.75\")   Wt 83.2 kg (183 lb 6.4 oz)   SpO2 97%   BMI 24.36 kg/m    Body mass index is 24.36 kg/m .  Physical Exam   GENERAL: healthy, " alert and no distress  MS: no gross musculoskeletal defects noted, no edema.  Left wrist exam was completely normal.    No results found for any visits on 10/06/21.

## 2021-10-06 NOTE — LETTER
[address] [address]  Phone: [phone]  Fax: [phone]    October 6, 2021        Burton Pillai  [address]  [facility] [id number]          To whom it may concern:    RE: Burton Pillai    Patient was seen and treated today at our clinic.  Patient may return to work 10/07/21 with the following:  No working or lifting restrictions    Please contact me for questions or concerns.      Sincerely,        Glenny Patel Mai, MD

## 2021-10-16 PROBLEM — S72.322A: Status: RESOLVED | Noted: 2019-12-14 | Resolved: 2021-10-16

## 2021-10-16 PROBLEM — I26.99 OTHER ACUTE PULMONARY EMBOLISM WITHOUT ACUTE COR PULMONALE (H): Status: RESOLVED | Noted: 2019-12-20 | Resolved: 2021-10-16

## 2021-10-16 PROBLEM — I26.99 OTHER PULMONARY EMBOLISM WITHOUT ACUTE COR PULMONALE (H): Status: RESOLVED | Noted: 2019-12-20 | Resolved: 2021-10-16

## 2021-12-03 ENCOUNTER — HOSPITAL ENCOUNTER (EMERGENCY)
Facility: CLINIC | Age: 31
Discharge: HOME | End: 2021-12-03
Attending: FAMILY MEDICINE | Admitting: FAMILY MEDICINE
Payer: COMMERCIAL

## 2021-12-03 VITALS
OXYGEN SATURATION: 97 % | TEMPERATURE: 97 F | HEART RATE: 69 BPM | SYSTOLIC BLOOD PRESSURE: 119 MMHG | WEIGHT: 185 LBS | BODY MASS INDEX: 24.52 KG/M2 | RESPIRATION RATE: 16 BRPM | HEIGHT: 73 IN | DIASTOLIC BLOOD PRESSURE: 67 MMHG

## 2021-12-03 DIAGNOSIS — G43.909 MIGRAINE WITHOUT STATUS MIGRAINOSUS, NOT INTRACTABLE, UNSPECIFIED MIGRAINE TYPE: ICD-10-CM

## 2021-12-03 PROCEDURE — 258N000003 HC RX IP 258 OP 636: Performed by: FAMILY MEDICINE

## 2021-12-03 PROCEDURE — 99284 EMERGENCY DEPT VISIT MOD MDM: CPT | Mod: 25 | Performed by: FAMILY MEDICINE

## 2021-12-03 PROCEDURE — 250N000011 HC RX IP 250 OP 636: Performed by: FAMILY MEDICINE

## 2021-12-03 PROCEDURE — 99284 EMERGENCY DEPT VISIT MOD MDM: CPT | Performed by: FAMILY MEDICINE

## 2021-12-03 PROCEDURE — 96375 TX/PRO/DX INJ NEW DRUG ADDON: CPT | Performed by: FAMILY MEDICINE

## 2021-12-03 PROCEDURE — 96365 THER/PROPH/DIAG IV INF INIT: CPT | Performed by: FAMILY MEDICINE

## 2021-12-03 RX ORDER — DIPHENHYDRAMINE HYDROCHLORIDE 50 MG/ML
25 INJECTION INTRAMUSCULAR; INTRAVENOUS ONCE
Status: COMPLETED | OUTPATIENT
Start: 2021-12-03 | End: 2021-12-03

## 2021-12-03 RX ORDER — KETOROLAC TROMETHAMINE 30 MG/ML
10 INJECTION, SOLUTION INTRAMUSCULAR; INTRAVENOUS ONCE
Status: COMPLETED | OUTPATIENT
Start: 2021-12-03 | End: 2021-12-03

## 2021-12-03 RX ORDER — MAGNESIUM SULFATE 1 G/100ML
1 INJECTION INTRAVENOUS ONCE
Status: COMPLETED | OUTPATIENT
Start: 2021-12-03 | End: 2021-12-03

## 2021-12-03 RX ORDER — SODIUM CHLORIDE 9 MG/ML
INJECTION, SOLUTION INTRAVENOUS CONTINUOUS
Status: DISCONTINUED | OUTPATIENT
Start: 2021-12-03 | End: 2021-12-03 | Stop reason: HOSPADM

## 2021-12-03 RX ADMIN — PROCHLORPERAZINE EDISYLATE 10 MG: 5 INJECTION INTRAMUSCULAR; INTRAVENOUS at 05:59

## 2021-12-03 RX ADMIN — KETOROLAC TROMETHAMINE 9.9 MG: 30 INJECTION, SOLUTION INTRAMUSCULAR at 06:05

## 2021-12-03 RX ADMIN — DIPHENHYDRAMINE HYDROCHLORIDE 25 MG: 50 INJECTION, SOLUTION INTRAMUSCULAR; INTRAVENOUS at 05:59

## 2021-12-03 RX ADMIN — MAGNESIUM SULFATE HEPTAHYDRATE 1 G: 1 INJECTION, SOLUTION INTRAVENOUS at 06:01

## 2021-12-03 RX ADMIN — SODIUM CHLORIDE: 9 INJECTION, SOLUTION INTRAVENOUS at 05:58

## 2021-12-03 ASSESSMENT — MIFFLIN-ST. JEOR: SCORE: 1858.03

## 2021-12-03 NOTE — ED TRIAGE NOTES
Pt states here for migraine HA. Reports history of migraines normally relieved with Ibuprofen however this one isn't. Last dose of Ibuprofen at 0300 this AM. Nausea with this. Denies vomiting or light sensitivity.

## 2021-12-03 NOTE — ED PROVIDER NOTES
History     Chief Complaint   Patient presents with     Headache     HPI  Burton Pillai is a 31 year old male who presents with a headache has been going on since yesterday morning.  Patient has a history of migraines which normally responds to just Tylenol or Excedrin Migraine.  Patient states that this headache has not improved at all.  He is having intermittent light sensitivity and nausea.  Patient was unable to sleep last night because of the headache.  Denies any fevers or chills.  Denies any neck pain or stiffness.  Nothing makes his symptoms better or worse.  Denies any extremity numbness or weakness.    Allergies:  Allergies   Allergen Reactions     No Known Drug Allergies        Problem List:    Patient Active Problem List    Diagnosis Date Noted     Acne 08/19/2010     Priority: Medium        Past Medical History:    Past Medical History:   Diagnosis Date     Closed displaced transverse fracture of shaft of left femur (H) 12/14/2019     Other acute pulmonary embolism without acute cor pulmonale (H) 12/20/2019     Other pulmonary embolism without acute cor pulmonale (H)      Snowboarding accident        Past Surgical History:    Past Surgical History:   Procedure Laterality Date     ORTHOPEDIC SURGERY  12/14/19    Broke Femur, insert duy and pins.       Family History:    Family History   Problem Relation Age of Onset     Diabetes Father      Hypertension Father      Heart Disease Father      Coronary Artery Disease Brother      Breast Cancer Maternal Grandmother      Lipids Maternal Grandmother      Thyroid Disease Paternal Grandmother      Diabetes Sister      Depression Sister      Anxiety Disorder Sister        Social History:  Marital Status:   [2]  Social History     Tobacco Use     Smoking status: Passive Smoke Exposure - Never Smoker     Smokeless tobacco: Never Used     Tobacco comment: parents   Vaping Use     Vaping Use: Never used   Substance Use Topics     Alcohol use: Yes      "Alcohol/week: 5.0 standard drinks     Comment: 1-2 drinks a month     Drug use: No        Medications:    No current outpatient medications on file.        Review of Systems   All other systems reviewed and are negative.      Physical Exam   BP: (!) 143/84  Pulse: 72  Temp: 97  F (36.1  C)  Resp: 16  Height: 185.4 cm (6' 1\")  Weight: 83.9 kg (185 lb)  SpO2: 97 %      Physical Exam  Vitals and nursing note reviewed.   Constitutional:       General: He is not in acute distress.     Appearance: He is well-developed. He is not diaphoretic.   HENT:      Head: Normocephalic and atraumatic.   Eyes:      Conjunctiva/sclera: Conjunctivae normal.   Cardiovascular:      Rate and Rhythm: Normal rate and regular rhythm.      Heart sounds: Normal heart sounds. No murmur heard.      Pulmonary:      Effort: Pulmonary effort is normal. No respiratory distress.      Breath sounds: Normal breath sounds. No stridor. No wheezing.   Abdominal:      General: Bowel sounds are normal. There is no distension.      Palpations: Abdomen is soft.      Tenderness: There is no abdominal tenderness. There is no guarding.   Musculoskeletal:         General: Normal range of motion.      Cervical back: Normal range of motion.   Skin:     General: Skin is warm and dry.      Findings: No rash.   Neurological:      Mental Status: He is alert and oriented to person, place, and time.   Psychiatric:         Judgment: Judgment normal.         ED Course          No results found for this or any previous visit (from the past 24 hour(s)).    Medications   sodium chloride 0.9% infusion ( Intravenous New Bag 12/3/21 0558)   ketorolac (TORADOL) injection 9.9 mg (9.9 mg Intravenous Given 12/3/21 0605)   prochlorperazine (COMPAZINE) injection 10 mg (10 mg Intravenous Given 12/3/21 0559)   diphenhydrAMINE (BENADRYL) injection 25 mg (25 mg Intravenous Given 12/3/21 0559)   magnesium sulfate 1 gm in 100mL D5W intermittent infusion (1 g Intravenous New Bag 12/3/21 0601) "       31-year-old male presents with symptoms typical of his headaches/migraines.  It was unresponsive to his usual treatments at home.  Patient is feeling significantly better after the above medications were given.  There is no indication for advanced imaging at this time.  We will discharge the patient home at this time.  Patient will go home and rest and I think the headache should be much improved when he wakes up.  All questions were answered and patient safe to be discharged at this time    Assessments & Plan (with Medical Decision Making)  Migraine headache     I have reviewed the nursing notes.    I have reviewed the findings, diagnosis, plan and need for follow up with the patient.              12/3/2021   [facility] EMERGENCY DEPT     Stevie Gonzalez MD  12/03/21 2444

## 2021-12-29 ENCOUNTER — HOSPITAL ENCOUNTER (EMERGENCY)
Facility: CLINIC | Age: 31
Discharge: HOME | End: 2021-12-29
Attending: NURSE PRACTITIONER | Admitting: NURSE PRACTITIONER
Payer: COMMERCIAL

## 2021-12-29 VITALS
BODY MASS INDEX: 25.07 KG/M2 | HEART RATE: 61 BPM | SYSTOLIC BLOOD PRESSURE: 134 MMHG | OXYGEN SATURATION: 99 % | RESPIRATION RATE: 18 BRPM | TEMPERATURE: 98.1 F | WEIGHT: 190 LBS | DIASTOLIC BLOOD PRESSURE: 78 MMHG

## 2021-12-29 DIAGNOSIS — S61.209A AVULSION OF FINGERTIP, INITIAL ENCOUNTER: ICD-10-CM

## 2021-12-29 PROCEDURE — 99282 EMERGENCY DEPT VISIT SF MDM: CPT | Performed by: NURSE PRACTITIONER

## 2021-12-29 NOTE — ED TRIAGE NOTES
Lacerated right liger on razorblade that was in the drain.  Bleeding controled at triage with home dressing.

## 2021-12-29 NOTE — DISCHARGE INSTRUCTIONS
Keep wound clean and dry. Cover with bandage until dry and no drainage.  Change the tube gauze dressing tomorrow and then twice a day.  Return for any signs of infection (increased redness, pain or drainage).

## 2021-12-30 NOTE — ED PROVIDER NOTES
History     Chief Complaint   Patient presents with     Laceration     HPI  Burton Pillai is a 31 year old male who presents for evaluation of right index fingertip avulsion.  Patient tried to grab a razor blade out of the sink drain and accidentally cut the tip of his finger pad off.  Continued to ooze of blood, so he presented here for evaluation.  Tetanus is up-to-date.    Allergies:  Allergies   Allergen Reactions     No Known Drug Allergies        Problem List:    Patient Active Problem List    Diagnosis Date Noted     Acne 08/19/2010     Priority: Medium        Past Medical History:    Past Medical History:   Diagnosis Date     Closed displaced transverse fracture of shaft of left femur (H) 12/14/2019     Other acute pulmonary embolism without acute cor pulmonale (H) 12/20/2019     Other pulmonary embolism without acute cor pulmonale (H)      Snowboarding accident        Past Surgical History:    Past Surgical History:   Procedure Laterality Date     ORTHOPEDIC SURGERY  12/14/19    Broke Femur, insert duy and pins.       Family History:    Family History   Problem Relation Age of Onset     Diabetes Father      Hypertension Father      Heart Disease Father      Coronary Artery Disease Brother      Breast Cancer Maternal Grandmother      Lipids Maternal Grandmother      Thyroid Disease Paternal Grandmother      Diabetes Sister      Depression Sister      Anxiety Disorder Sister        Social History:  Marital Status:   [2]  Social History     Tobacco Use     Smoking status: Passive Smoke Exposure - Never Smoker     Smokeless tobacco: Never Used     Tobacco comment: parents   Vaping Use     Vaping Use: Never used   Substance Use Topics     Alcohol use: Yes     Alcohol/week: 5.0 standard drinks     Comment: 1-2 drinks a month     Drug use: No        Medications:    No current outpatient medications on file.        Review of Systems  As mentioned above in the history present illness. All other  systems were reviewed and are negative.    Physical Exam   BP: 134/78  Pulse: 61  Temp: 98.1  F (36.7  C)  Resp: 18  Weight: 86.2 kg (190 lb)  SpO2: 99 %      Physical Exam  Appearance: NAD.  Alert and oriented.  Right hand:  --Index finger pad with a 1 cm area of skin avulsion, superficial.  Does not appear to be bleeding anymore.  No debris or contamination.  Wound is pink.  No significant swelling.    ED Course                 Procedures            No results found for this or any previous visit (from the past 24 hour(s)).    Medications - No data to display    Assessments & Plan (with Medical Decision Making)   Right index fingertip skin avulsion.  Cleansed with normal saline and tube gauze applied.    Keep wound clean and dry. Cover with bandage until dry and no drainage.  Change the tube gauze dressing tomorrow and then twice a day.  Return for any signs of infection (increased redness, pain or drainage).    There are no discharge medications for this patient.      Final diagnoses:   Avulsion of fingertip, initial encounter       12/29/2021   [facility] EMERGENCY DEPT     Radha, KWAME Tsai CNP  12/29/21 2150

## 2022-03-03 ENCOUNTER — LAB (OUTPATIENT)
Dept: LAB | Facility: OTHER | Age: 32
End: 2022-03-03
Attending: FAMILY MEDICINE
Payer: COMMERCIAL

## 2022-03-03 DIAGNOSIS — Z20.822 ENCOUNTER FOR LABORATORY TESTING FOR COVID-19 VIRUS: ICD-10-CM

## 2022-03-03 PROCEDURE — U0003 INFECTIOUS AGENT DETECTION BY NUCLEIC ACID (DNA OR RNA); SEVERE ACUTE RESPIRATORY SYNDROME CORONAVIRUS 2 (SARS-COV-2) (CORONAVIRUS DISEASE [COVID-19]), AMPLIFIED PROBE TECHNIQUE, MAKING USE OF HIGH THROUGHPUT TECHNOLOGIES AS DESCRIBED BY CMS-2020-01-R: HCPCS

## 2022-03-03 PROCEDURE — U0005 INFEC AGEN DETEC AMPLI PROBE: HCPCS

## 2022-03-04 LAB — SARS-COV-2 RNA RESP QL NAA+PROBE: NEGATIVE

## 2022-10-24 ENCOUNTER — OFFICE VISIT (OUTPATIENT)
Dept: FAMILY MEDICINE | Facility: CLINIC | Age: 32
End: 2022-10-24
Payer: COMMERCIAL

## 2022-10-24 VITALS
HEIGHT: 73 IN | BODY MASS INDEX: 25.31 KG/M2 | WEIGHT: 191 LBS | RESPIRATION RATE: 16 BRPM | HEART RATE: 72 BPM | TEMPERATURE: 97.9 F | SYSTOLIC BLOOD PRESSURE: 122 MMHG | OXYGEN SATURATION: 99 % | DIASTOLIC BLOOD PRESSURE: 78 MMHG

## 2022-10-24 DIAGNOSIS — Z86.711 HISTORY OF PULMONARY EMBOLISM: ICD-10-CM

## 2022-10-24 DIAGNOSIS — Z13.220 LIPID SCREENING: ICD-10-CM

## 2022-10-24 DIAGNOSIS — Z00.00 ROUTINE HISTORY AND PHYSICAL EXAMINATION OF ADULT: Primary | ICD-10-CM

## 2022-10-24 LAB
CHOLEST SERPL-MCNC: 197 MG/DL
D DIMER PPP FEU-MCNC: <0.27 UG/ML FEU (ref 0–0.5)
ERYTHROCYTE [DISTWIDTH] IN BLOOD BY AUTOMATED COUNT: 11.9 % (ref 10–15)
FASTING STATUS PATIENT QL REPORTED: YES
HCT VFR BLD AUTO: 44.7 % (ref 40–53)
HDLC SERPL-MCNC: 45 MG/DL
HGB BLD-MCNC: 15.6 G/DL (ref 13.3–17.7)
INR BLD: 1 (ref 0.9–1.1)
LDLC SERPL CALC-MCNC: 120 MG/DL
MCH RBC QN AUTO: 30.2 PG (ref 26.5–33)
MCHC RBC AUTO-ENTMCNC: 34.9 G/DL (ref 31.5–36.5)
MCV RBC AUTO: 87 FL (ref 78–100)
NONHDLC SERPL-MCNC: 152 MG/DL
PLATELET # BLD AUTO: 230 10E3/UL (ref 150–450)
RBC # BLD AUTO: 5.17 10E6/UL (ref 4.4–5.9)
TRIGL SERPL-MCNC: 162 MG/DL
WBC # BLD AUTO: 5.8 10E3/UL (ref 4–11)

## 2022-10-24 PROCEDURE — 80061 LIPID PANEL: CPT | Performed by: PHYSICIAN ASSISTANT

## 2022-10-24 PROCEDURE — 85379 FIBRIN DEGRADATION QUANT: CPT | Performed by: PHYSICIAN ASSISTANT

## 2022-10-24 PROCEDURE — 85027 COMPLETE CBC AUTOMATED: CPT | Performed by: PHYSICIAN ASSISTANT

## 2022-10-24 PROCEDURE — 85610 PROTHROMBIN TIME: CPT | Performed by: PHYSICIAN ASSISTANT

## 2022-10-24 PROCEDURE — 99395 PREV VISIT EST AGE 18-39: CPT | Performed by: PHYSICIAN ASSISTANT

## 2022-10-24 PROCEDURE — 36416 COLLJ CAPILLARY BLOOD SPEC: CPT | Performed by: PHYSICIAN ASSISTANT

## 2022-10-24 PROCEDURE — 36415 COLL VENOUS BLD VENIPUNCTURE: CPT | Performed by: PHYSICIAN ASSISTANT

## 2022-10-24 ASSESSMENT — ENCOUNTER SYMPTOMS
DIARRHEA: 0
PALPITATIONS: 0
MYALGIAS: 0
FEVER: 0
EYE PAIN: 0
CHILLS: 0
NAUSEA: 0
DIZZINESS: 0
CONSTIPATION: 0
FREQUENCY: 0
HEARTBURN: 0
HEMATOCHEZIA: 0
ABDOMINAL PAIN: 0
JOINT SWELLING: 0
WEAKNESS: 0
SHORTNESS OF BREATH: 0
NERVOUS/ANXIOUS: 0
HEMATURIA: 0
ARTHRALGIAS: 0
COUGH: 0
DYSURIA: 0
HEADACHES: 0
SORE THROAT: 0
PARESTHESIAS: 0

## 2022-10-24 ASSESSMENT — PAIN SCALES - GENERAL: PAINLEVEL: NO PAIN (0)

## 2022-10-24 NOTE — PROGRESS NOTES
SUBJECTIVE:   CC: Maverick is an 32 year old who presents for preventative health visit.   Patient has been advised of split billing requirements and indicates understanding: Yes  Healthy Habits:     Getting at least 3 servings of Calcium per day:  Yes    Bi-annual eye exam:  Yes    Dental care twice a year:  Yes    Sleep apnea or symptoms of sleep apnea:  None    Diet:  Regular (no restrictions)    Frequency of exercise:  2-3 days/week    Duration of exercise:  15-30 minutes    Taking medications regularly:  Yes    Medication side effects:  None    PHQ-2 Total Score: 0    Additional concerns today:  Yes    Femur fracture in 2019 and then developed a PE. Was on Coumadin for about 5 months but then COVID hit and stopped. He denies any new symptoms but still does note feeling winded with increased activity. Would like to check labs to ensure this is stable.     Has been having nosebleeds intermittently. Discussed using Vasline nightly and Flonase as well.     Would like list of providers that for a vasectomy.     Today's PHQ-2 Score:   PHQ-2 ( 1999 [employer]) 10/24/2022   Q1: Little interest or pleasure in doing things 0   Q2: Feeling down, depressed or hopeless 0   PHQ-2 Score 0   PHQ-2 Total Score (12-17 Years)- Positive if 3 or more points; Administer PHQ-A if positive -   Q1: Little interest or pleasure in doing things Not at all   Q2: Feeling down, depressed or hopeless Not at all   PHQ-2 Score 0       Abuse: Current or Past(Physical, Sexual or Emotional)- No  Do you feel safe in your environment? Yes        Social History     Tobacco Use     Smoking status: Never     Passive exposure: Yes     Smokeless tobacco: Never     Tobacco comments:     parents   Substance Use Topics     Alcohol use: Yes     Alcohol/week: 5.0 standard drinks     Comment: 1-2 drinks a month         Alcohol Use 10/24/2022   Prescreen: >3 drinks/day or >7 drinks/week? No   Prescreen: >3 drinks/day or >7 drinks/week? -       Last PSA: No results  found for: PSA    Reviewed orders with patient. Reviewed health maintenance and updated orders accordingly - Yes  BP Readings from Last 3 Encounters:   10/24/22 122/78   12/29/21 134/78   12/03/21 119/67    Wt Readings from Last 3 Encounters:   10/24/22 86.6 kg (191 lb)   12/29/21 86.2 kg (190 lb)   12/03/21 83.9 kg (185 lb)                  Patient Active Problem List   Diagnosis     Acne     Past Surgical History:   Procedure Laterality Date     ORTHOPEDIC SURGERY  12/14/19    Broke Femur, insert duy and pins.       Social History     Tobacco Use     Smoking status: Never     Passive exposure: Yes     Smokeless tobacco: Never     Tobacco comments:     parents   Substance Use Topics     Alcohol use: Yes     Alcohol/week: 5.0 standard drinks     Comment: 1-2 drinks a month     Family History   Problem Relation Age of Onset     Diabetes Father      Hypertension Father      Heart Disease Father      Diabetes Sister 35     Depression Sister      Anxiety Disorder Sister      Coronary Artery Disease Brother      Breast Cancer Maternal Grandmother      Lipids Maternal Grandmother      Thyroid Disease Paternal Grandmother          No current outpatient medications on file.       Reviewed and updated as needed this visit by clinical staff   Tobacco  Allergies  Meds   Med Hx  Surg Hx  Fam Hx  Soc Hx        Reviewed and updated as needed this visit by Provider                   Review of Systems   Constitutional: Negative for chills and fever.   HENT: Negative for congestion, ear pain, hearing loss and sore throat.    Eyes: Negative for pain and visual disturbance.   Respiratory: Negative for cough and shortness of breath.    Cardiovascular: Negative for chest pain, palpitations and peripheral edema.   Gastrointestinal: Negative for abdominal pain, constipation, diarrhea, heartburn, hematochezia and nausea.   Genitourinary: Negative for dysuria, frequency, genital sores, hematuria, impotence, penile discharge and  "urgency.   Musculoskeletal: Negative for arthralgias, joint swelling and myalgias.   Skin: Negative for rash.   Neurological: Negative for dizziness, weakness, headaches and paresthesias.   Psychiatric/Behavioral: Negative for mood changes. The patient is not nervous/anxious.        OBJECTIVE:   /78   Pulse 72   Temp 97.9  F (36.6  C) (Temporal)   Ht 1.851 m (6' 0.89\")   Wt 86.6 kg (191 lb)   SpO2 99%   BMI 25.28 kg/m      Physical Exam  GENERAL: healthy, alert and no distress  EYES: Eyes grossly normal to inspection, PERRL and conjunctivae and sclerae normal  HENT: ear canals and TM's normal, nose and mouth without ulcers or lesions  NECK: no adenopathy, no asymmetry, masses, or scars and thyroid normal to palpation  RESP: lungs clear to auscultation - no rales, rhonchi or wheezes  CV: regular rate and rhythm, normal S1 S2, no S3 or S4, no murmur, click or rub, no peripheral edema and peripheral pulses strong  ABDOMEN: soft, nontender, no hepatosplenomegaly, no masses and bowel sounds normal  MS: no gross musculoskeletal defects noted, no edema  SKIN: no suspicious lesions or rashes  NEURO: Normal strength and tone, mentation intact and speech normal  PSYCH: mentation appears normal, affect normal/bright    Diagnostic Test Results:  Labs reviewed in Epic    ASSESSMENT/PLAN:   (Z00.00) Routine history and physical examination of adult  (primary encounter diagnosis)    (Z86.711) History of pulmonary embolism  Comment: Related to femur fracture. No other clot history.   Plan: CBC with platelets, D dimer, quantitative, INR         point of care    (Z13.220) Lipid screening  Plan: Lipid panel reflex to direct LDL Fasting      Patient has been advised of split billing requirements and indicates understanding: Yes      COUNSELING:   Reviewed preventive health counseling, as reflected in patient instructions    Estimated body mass index is 25.28 kg/m  as calculated from the following:    Height as of this " "encounter: 1.851 m (6' 0.89\").    Weight as of this encounter: 86.6 kg (191 lb).     Weight management plan: Discussed healthy diet and exercise guidelines    He reports that he has never smoked. He has been exposed to tobacco smoke. He has never used smokeless tobacco.      Counseling Resources:  ATP IV Guidelines  Pooled Cohorts Equation Calculator  FRAX Risk Assessment  ICSI Preventive Guidelines  Dietary Guidelines for Americans, 2010  USDA's MyPlate  ASA Prophylaxis  Lung CA Screening    ROGER Alexandre [facility]  "

## 2022-11-10 ENCOUNTER — APPOINTMENT (OUTPATIENT)
Dept: GENERAL RADIOLOGY | Facility: CLINIC | Age: 32
End: 2022-11-10
Attending: FAMILY MEDICINE
Payer: COMMERCIAL

## 2022-11-10 ENCOUNTER — HOSPITAL ENCOUNTER (EMERGENCY)
Facility: CLINIC | Age: 32
Discharge: HOME | End: 2022-11-10
Attending: FAMILY MEDICINE | Admitting: FAMILY MEDICINE
Payer: COMMERCIAL

## 2022-11-10 VITALS
TEMPERATURE: 98 F | WEIGHT: 190 LBS | SYSTOLIC BLOOD PRESSURE: 129 MMHG | RESPIRATION RATE: 18 BRPM | BODY MASS INDEX: 25.14 KG/M2 | DIASTOLIC BLOOD PRESSURE: 83 MMHG | HEART RATE: 61 BPM | OXYGEN SATURATION: 100 %

## 2022-11-10 DIAGNOSIS — L03.031 CELLULITIS OF RIGHT TOE: ICD-10-CM

## 2022-11-10 PROCEDURE — 10060 I&D ABSCESS SIMPLE/SINGLE: CPT | Performed by: FAMILY MEDICINE

## 2022-11-10 PROCEDURE — 99284 EMERGENCY DEPT VISIT MOD MDM: CPT | Mod: 25 | Performed by: FAMILY MEDICINE

## 2022-11-10 PROCEDURE — 99283 EMERGENCY DEPT VISIT LOW MDM: CPT | Mod: 25 | Performed by: FAMILY MEDICINE

## 2022-11-10 PROCEDURE — 73660 X-RAY EXAM OF TOE(S): CPT | Mod: RT

## 2022-11-10 ASSESSMENT — ACTIVITIES OF DAILY LIVING (ADL): ADLS_ACUITY_SCORE: 35

## 2022-11-10 NOTE — ED TRIAGE NOTES
Pt presents with right 2nd toe injury. Pt states he stubbed it last week sometime and now it is more swollen and painful. Concerned for infection     Triage Assessment     Row Name 11/10/22 7687       Triage Assessment (Adult)    Airway WDL WDL       Respiratory WDL    Respiratory WDL WDL       Cardiac WDL    Cardiac WDL WDL

## 2022-11-11 NOTE — ED PROVIDER NOTES
History     Chief Complaint   Patient presents with     Toe Injury     HPI  Burton Pillai is a 32 year old male who presents with possible infection to the right toe.  Patient states he injured it about a week ago.  He is unsure what he did he just noticed that he had a little abrasion and it kind of hurt.  Since then he has had increasing redness and swelling of the toe.  Denies any fevers or chills.  He was worried he might have a pus pocket in there.    Allergies:  Allergies   Allergen Reactions     No Known Drug Allergies        Problem List:    Patient Active Problem List    Diagnosis Date Noted     Acne 08/19/2010     Priority: Medium        Past Medical History:    Past Medical History:   Diagnosis Date     Closed displaced transverse fracture of shaft of left femur (H) 12/14/2019     Other acute pulmonary embolism without acute cor pulmonale (H) 12/20/2019     Other pulmonary embolism without acute cor pulmonale (H)      Snowboarding accident        Past Surgical History:    Past Surgical History:   Procedure Laterality Date     ORTHOPEDIC SURGERY  12/14/19    Broke Femur, insert duy and pins.       Family History:    Family History   Problem Relation Age of Onset     Diabetes Father      Hypertension Father      Heart Disease Father      Diabetes Sister 35     Depression Sister      Anxiety Disorder Sister      Coronary Artery Disease Brother      Breast Cancer Maternal Grandmother      Lipids Maternal Grandmother      Thyroid Disease Paternal Grandmother        Social History:  Marital Status:   [2]  Social History     Tobacco Use     Smoking status: Never     Passive exposure: Yes     Smokeless tobacco: Never     Tobacco comments:     parents   Vaping Use     Vaping Use: Never used   Substance Use Topics     Alcohol use: Yes     Alcohol/week: 5.0 standard drinks     Comment: 1-2 drinks a month     Drug use: No        Medications:    amoxicillin-clavulanate (AUGMENTIN) 875-125 MG  tablet          Review of Systems   All other systems reviewed and are negative.      Physical Exam   BP: 129/83  Pulse: 61  Temp: 98  F (36.7  C)  Resp: 18  Weight: 86.2 kg (190 lb)  SpO2: 100 %      Physical Exam  Vitals and nursing note reviewed.   Constitutional:       General: He is not in acute distress.     Appearance: Normal appearance. He is not ill-appearing.   Musculoskeletal:      Right foot: Normal range of motion. Swelling and tenderness present.        Legs:    Neurological:      Mental Status: He is alert.         ED Course                 Procedures           Results for orders placed or performed during the hospital encounter of 11/10/22 (from the past 24 hour(s))   XR Toe Right G/E 2 Views    Narrative    EXAM: XR TOE RIGHT G/E 2 VIEWS  LOCATION: [facility]  DATE/TIME: 11/10/2022 5:42 PM    INDICATION: injury, increased swelling and pain  COMPARISON: None.      Impression    IMPRESSION: Tiny accessory ossicle adjacent to the first metatarsal head. This is not suggestive of an acute fracture. The right great toe is otherwise negative.       Medications - No data to display     X-ray was negative for fracture.  I thought I felt a fluctuant area near the cuticle so I made a small incision after doing a digital block.  I was unable to get any pus out of the area.  I think patient likely just has a cellulitis of the toe.  We will start the patient on a course of Augmentin.  Patient will follow-up if there is no improvement in the next 10 days.    Assessments & Plan (with Medical Decision Making)   cellulitis right toe     I have reviewed the nursing notes.    I have reviewed the findings, diagnosis, plan and need for follow up with the patient.      New Prescriptions    AMOXICILLIN-CLAVULANATE (AUGMENTIN) 875-125 MG TABLET    Take 1 tablet by mouth 2 times daily for 10 days       Final diagnoses:   Cellulitis of right toe       11/10/2022   [facility]  EMERGENCY DEPT     Stevie Gonzalez MD  11/10/22 6888

## 2023-01-07 ENCOUNTER — HEALTH MAINTENANCE LETTER (OUTPATIENT)
Age: 33
End: 2023-01-07

## 2023-12-02 ENCOUNTER — HEALTH MAINTENANCE LETTER (OUTPATIENT)
Age: 33
End: 2023-12-02

## 2024-03-27 ENCOUNTER — TELEPHONE (OUTPATIENT)
Dept: FAMILY MEDICINE | Facility: CLINIC | Age: 34
End: 2024-03-27
Payer: COMMERCIAL

## 2024-03-27 NOTE — TELEPHONE ENCOUNTER
Patient read [email or web address] message. Last read by Maverick Pillai at  2:28 PM on 3/27/2024.     Closing encounter.   Marly Mera MA

## 2024-03-27 NOTE — TELEPHONE ENCOUNTER
Called and LM for patient to call back, "[employer] message sent as well. Please let patient know that Chiara Watts will no longer be in on 7/26/2024 and this will need to be rescheduled.     Marly Mera MA

## 2024-10-14 ENCOUNTER — HOSPITAL ENCOUNTER (EMERGENCY)
Facility: CLINIC | Age: 34
Discharge: HOME | End: 2024-10-14
Attending: FAMILY MEDICINE | Admitting: FAMILY MEDICINE
Payer: COMMERCIAL

## 2024-10-14 ENCOUNTER — APPOINTMENT (OUTPATIENT)
Dept: GENERAL RADIOLOGY | Facility: CLINIC | Age: 34
End: 2024-10-14
Attending: FAMILY MEDICINE
Payer: COMMERCIAL

## 2024-10-14 ENCOUNTER — APPOINTMENT (OUTPATIENT)
Dept: CT IMAGING | Facility: CLINIC | Age: 34
End: 2024-10-14
Attending: STUDENT IN AN ORGANIZED HEALTH CARE EDUCATION/TRAINING PROGRAM
Payer: COMMERCIAL

## 2024-10-14 ENCOUNTER — HOSPITAL ENCOUNTER (EMERGENCY)
Facility: CLINIC | Age: 34
Discharge: HOME | End: 2024-10-14
Attending: STUDENT IN AN ORGANIZED HEALTH CARE EDUCATION/TRAINING PROGRAM | Admitting: STUDENT IN AN ORGANIZED HEALTH CARE EDUCATION/TRAINING PROGRAM
Payer: COMMERCIAL

## 2024-10-14 VITALS
WEIGHT: 188 LBS | SYSTOLIC BLOOD PRESSURE: 123 MMHG | OXYGEN SATURATION: 94 % | TEMPERATURE: 99.2 F | HEART RATE: 62 BPM | HEIGHT: 70 IN | RESPIRATION RATE: 20 BRPM | BODY MASS INDEX: 26.92 KG/M2 | DIASTOLIC BLOOD PRESSURE: 75 MMHG

## 2024-10-14 VITALS
BODY MASS INDEX: 24.96 KG/M2 | SYSTOLIC BLOOD PRESSURE: 124 MMHG | TEMPERATURE: 98.2 F | HEIGHT: 73 IN | HEART RATE: 66 BPM | RESPIRATION RATE: 20 BRPM | WEIGHT: 188.3 LBS | OXYGEN SATURATION: 97 % | DIASTOLIC BLOOD PRESSURE: 81 MMHG

## 2024-10-14 DIAGNOSIS — J18.9 COMMUNITY ACQUIRED PNEUMONIA OF RIGHT LOWER LOBE OF LUNG: ICD-10-CM

## 2024-10-14 DIAGNOSIS — J18.9 PNEUMONIA OF RIGHT LOWER LOBE DUE TO INFECTIOUS ORGANISM: ICD-10-CM

## 2024-10-14 LAB
ALBUMIN SERPL BCG-MCNC: 3.8 G/DL (ref 3.5–5.2)
ALP SERPL-CCNC: 73 U/L (ref 40–150)
ALT SERPL W P-5'-P-CCNC: 33 U/L (ref 0–70)
ANION GAP SERPL CALCULATED.3IONS-SCNC: 12 MMOL/L (ref 7–15)
AST SERPL W P-5'-P-CCNC: 31 U/L (ref 0–45)
BASOPHILS # BLD AUTO: 0 10E3/UL (ref 0–0.2)
BASOPHILS NFR BLD AUTO: 1 %
BILIRUB SERPL-MCNC: 0.3 MG/DL
BUN SERPL-MCNC: 14.2 MG/DL (ref 6–20)
CALCIUM SERPL-MCNC: 9 MG/DL (ref 8.8–10.4)
CHLORIDE SERPL-SCNC: 98 MMOL/L (ref 98–107)
CREAT SERPL-MCNC: 0.9 MG/DL (ref 0.67–1.17)
CRP SERPL-MCNC: 63.32 MG/L
D DIMER PPP FEU-MCNC: 0.59 UG/ML FEU (ref 0–0.5)
EGFRCR SERPLBLD CKD-EPI 2021: >90 ML/MIN/1.73M2
EOSINOPHIL # BLD AUTO: 0.2 10E3/UL (ref 0–0.7)
EOSINOPHIL NFR BLD AUTO: 3 %
ERYTHROCYTE [DISTWIDTH] IN BLOOD BY AUTOMATED COUNT: 12.5 % (ref 10–15)
ERYTHROCYTE [SEDIMENTATION RATE] IN BLOOD BY WESTERGREN METHOD: 29 MM/HR (ref 0–15)
GLUCOSE SERPL-MCNC: 108 MG/DL (ref 70–99)
HCO3 SERPL-SCNC: 25 MMOL/L (ref 22–29)
HCT VFR BLD AUTO: 42.8 % (ref 40–53)
HGB BLD-MCNC: 14.7 G/DL (ref 13.3–17.7)
IMM GRANULOCYTES # BLD: 0 10E3/UL
IMM GRANULOCYTES NFR BLD: 0 %
LYMPHOCYTES # BLD AUTO: 1.3 10E3/UL (ref 0.8–5.3)
LYMPHOCYTES NFR BLD AUTO: 20 %
MCH RBC QN AUTO: 28.9 PG (ref 26.5–33)
MCHC RBC AUTO-ENTMCNC: 34.3 G/DL (ref 31.5–36.5)
MCV RBC AUTO: 84 FL (ref 78–100)
MONOCYTES # BLD AUTO: 1.2 10E3/UL (ref 0–1.3)
MONOCYTES NFR BLD AUTO: 18 %
MONOCYTES NFR BLD AUTO: NEGATIVE %
NEUTROPHILS # BLD AUTO: 3.8 10E3/UL (ref 1.6–8.3)
NEUTROPHILS NFR BLD AUTO: 59 %
NRBC # BLD AUTO: 0 10E3/UL
NRBC BLD AUTO-RTO: 0 /100
PLATELET # BLD AUTO: 210 10E3/UL (ref 150–450)
POTASSIUM SERPL-SCNC: 4.2 MMOL/L (ref 3.4–5.3)
PROT SERPL-MCNC: 7.4 G/DL (ref 6.4–8.3)
RBC # BLD AUTO: 5.08 10E6/UL (ref 4.4–5.9)
SODIUM SERPL-SCNC: 135 MMOL/L (ref 135–145)
WBC # BLD AUTO: 6.6 10E3/UL (ref 4–11)

## 2024-10-14 PROCEDURE — 99284 EMERGENCY DEPT VISIT MOD MDM: CPT | Performed by: STUDENT IN AN ORGANIZED HEALTH CARE EDUCATION/TRAINING PROGRAM

## 2024-10-14 PROCEDURE — 80053 COMPREHEN METABOLIC PANEL: CPT | Performed by: FAMILY MEDICINE

## 2024-10-14 PROCEDURE — 99284 EMERGENCY DEPT VISIT MOD MDM: CPT | Performed by: FAMILY MEDICINE

## 2024-10-14 PROCEDURE — 96375 TX/PRO/DX INJ NEW DRUG ADDON: CPT | Performed by: FAMILY MEDICINE

## 2024-10-14 PROCEDURE — 85379 FIBRIN DEGRADATION QUANT: CPT | Performed by: STUDENT IN AN ORGANIZED HEALTH CARE EDUCATION/TRAINING PROGRAM

## 2024-10-14 PROCEDURE — 96365 THER/PROPH/DIAG IV INF INIT: CPT | Performed by: FAMILY MEDICINE

## 2024-10-14 PROCEDURE — 36415 COLL VENOUS BLD VENIPUNCTURE: CPT | Performed by: STUDENT IN AN ORGANIZED HEALTH CARE EDUCATION/TRAINING PROGRAM

## 2024-10-14 PROCEDURE — 99285 EMERGENCY DEPT VISIT HI MDM: CPT | Mod: 25 | Performed by: STUDENT IN AN ORGANIZED HEALTH CARE EDUCATION/TRAINING PROGRAM

## 2024-10-14 PROCEDURE — 250N000011 HC RX IP 250 OP 636: Performed by: FAMILY MEDICINE

## 2024-10-14 PROCEDURE — 36415 COLL VENOUS BLD VENIPUNCTURE: CPT | Performed by: FAMILY MEDICINE

## 2024-10-14 PROCEDURE — 71045 X-RAY EXAM CHEST 1 VIEW: CPT

## 2024-10-14 PROCEDURE — 86140 C-REACTIVE PROTEIN: CPT | Performed by: FAMILY MEDICINE

## 2024-10-14 PROCEDURE — 250N000009 HC RX 250: Performed by: STUDENT IN AN ORGANIZED HEALTH CARE EDUCATION/TRAINING PROGRAM

## 2024-10-14 PROCEDURE — 250N000011 HC RX IP 250 OP 636: Performed by: STUDENT IN AN ORGANIZED HEALTH CARE EDUCATION/TRAINING PROGRAM

## 2024-10-14 PROCEDURE — 86308 HETEROPHILE ANTIBODY SCREEN: CPT | Performed by: FAMILY MEDICINE

## 2024-10-14 PROCEDURE — 99285 EMERGENCY DEPT VISIT HI MDM: CPT | Mod: 25,27 | Performed by: FAMILY MEDICINE

## 2024-10-14 PROCEDURE — 85025 COMPLETE CBC W/AUTO DIFF WBC: CPT | Performed by: FAMILY MEDICINE

## 2024-10-14 PROCEDURE — 71275 CT ANGIOGRAPHY CHEST: CPT

## 2024-10-14 PROCEDURE — 85652 RBC SED RATE AUTOMATED: CPT | Performed by: FAMILY MEDICINE

## 2024-10-14 RX ORDER — AZITHROMYCIN 250 MG/1
TABLET, FILM COATED ORAL
Qty: 6 TABLET | Refills: 0 | Status: SHIPPED | OUTPATIENT
Start: 2024-10-14 | End: 2024-10-19

## 2024-10-14 RX ORDER — KETOROLAC TROMETHAMINE 15 MG/ML
15 INJECTION, SOLUTION INTRAMUSCULAR; INTRAVENOUS ONCE
Status: COMPLETED | OUTPATIENT
Start: 2024-10-14 | End: 2024-10-14

## 2024-10-14 RX ORDER — IOPAMIDOL 755 MG/ML
500 INJECTION, SOLUTION INTRAVASCULAR ONCE
Status: COMPLETED | OUTPATIENT
Start: 2024-10-14 | End: 2024-10-14

## 2024-10-14 RX ORDER — CEFTRIAXONE 2 G/1
2 INJECTION, POWDER, FOR SOLUTION INTRAMUSCULAR; INTRAVENOUS ONCE
Status: COMPLETED | OUTPATIENT
Start: 2024-10-14 | End: 2024-10-14

## 2024-10-14 RX ADMIN — IOPAMIDOL 64 ML: 755 INJECTION, SOLUTION INTRAVENOUS at 20:51

## 2024-10-14 RX ADMIN — KETOROLAC TROMETHAMINE 15 MG: 15 INJECTION, SOLUTION INTRAMUSCULAR; INTRAVENOUS at 05:48

## 2024-10-14 RX ADMIN — SODIUM CHLORIDE 70 ML: 9 INJECTION, SOLUTION INTRAVENOUS at 20:51

## 2024-10-14 RX ADMIN — CEFTRIAXONE SODIUM 2 G: 2 INJECTION, POWDER, FOR SOLUTION INTRAMUSCULAR; INTRAVENOUS at 06:31

## 2024-10-14 ASSESSMENT — COLUMBIA-SUICIDE SEVERITY RATING SCALE - C-SSRS
1. IN THE PAST MONTH, HAVE YOU WISHED YOU WERE DEAD OR WISHED YOU COULD GO TO SLEEP AND NOT WAKE UP?: NO
2. HAVE YOU ACTUALLY HAD ANY THOUGHTS OF KILLING YOURSELF IN THE PAST MONTH?: NO
6. HAVE YOU EVER DONE ANYTHING, STARTED TO DO ANYTHING, OR PREPARED TO DO ANYTHING TO END YOUR LIFE?: NO
2. HAVE YOU ACTUALLY HAD ANY THOUGHTS OF KILLING YOURSELF IN THE PAST MONTH?: NO
1. IN THE PAST MONTH, HAVE YOU WISHED YOU WERE DEAD OR WISHED YOU COULD GO TO SLEEP AND NOT WAKE UP?: NO
6. HAVE YOU EVER DONE ANYTHING, STARTED TO DO ANYTHING, OR PREPARED TO DO ANYTHING TO END YOUR LIFE?: NO

## 2024-10-14 ASSESSMENT — ACTIVITIES OF DAILY LIVING (ADL)
ADLS_ACUITY_SCORE: 35
ADLS_ACUITY_SCORE: 33
ADLS_ACUITY_SCORE: 35

## 2024-10-14 ASSESSMENT — ENCOUNTER SYMPTOMS
COUGH: 1
SHORTNESS OF BREATH: 1

## 2024-10-14 NOTE — ED TRIAGE NOTES
PT presents with c/o worsening cough, and SOB, Chest discomfort. PT reports that he was seen earlier in the ED with pneumonia. PT reports that on 2020 he was diagnosed with pulmonary embolism following an accident on 2019 while snowboarding and broke his left Femur, ORIF done.

## 2024-10-14 NOTE — Clinical Note
Burton Roshansawyer was seen and treated in our emergency department on 10/14/2024.    Please excuse from work due to illness.  He may return when improved and afebrile for 24 hours while off of fever reducing medications.     Sincerely,     [facility] Emergency Dept

## 2024-10-14 NOTE — ED TRIAGE NOTES
Pt c/o fever, generalized body aches the last two weeks, and sore throat. Body aches to chest, abdomen, and legs.  Drove himself here, has not taken any medications this am.     Triage Assessment (Adult)       Row Name 10/14/24 0511          Triage Assessment    Airway WDL WDL        Respiratory WDL    Respiratory WDL WDL        Cognitive/Neuro/Behavioral WDL    Cognitive/Neuro/Behavioral WDL WDL

## 2024-10-14 NOTE — DISCHARGE INSTRUCTIONS
Take the Augmentin and Zithromax as directed.    You can continue with the Mucinex to help loosen secretions.  Mucinex DM may help with your cough if it is keeping you up at night.    Drink plenty of fluids.  Get plenty of rest.    Tylenol/ibuprofen as needed for fever or discomfort.    You can go back to work when you are feeling better and you have been without a fever for 24 hours while off of fever reducing medications.  Recheck in clinic if persistent problems.  Return to the ED if you worsen or have any concerns.  It was nice visiting with you this morning.  I hope you feel better soon.    Thank you for choosing [facility]. We appreciate the opportunity to meet your urgent medical needs. Please let us know if we could have done anything to make your stay more satisfying.    After discharge, please closely monitor for any new or worsening symptoms. Return to the Emergency Department if you develop any acute worsening signs or symptoms.    If you had lab work, cultures or imaging studies done during your stay, the final results may still be pending. We will call you if your plan of care needs to change. However, if you are not improving as expected, please follow up with your primary care provider or clinic.     Start any prescription medications that were prescribed to you and take them as directed.     Please see additional handouts that may be pertinent to your condition.

## 2024-10-14 NOTE — ED PROVIDER NOTES
History     Chief Complaint   Patient presents with    Fever    Generalized Body Aches     HPI  Burton Pillai is a 34 year old male who presents to the ED with a greater than 1 week history of fevers, body aches and cough.  Cough was productive but is now been dry last couple days.  Took a COVID test yesterday and was normal.  Appetite has been down.  Has been able to drink water.  Has some intermittent abdominal discomfort maybe once or twice an hour that comes across in waves.  Just feels wiped out at times.  Works for [employer] and is missed work last week.  Otherwise in good health.    Allergies:  Allergies   Allergen Reactions    No Known Drug Allergy        Problem List:    Patient Active Problem List    Diagnosis Date Noted    Acne 08/19/2010     Priority: Medium        Past Medical History:    Past Medical History:   Diagnosis Date    Closed displaced transverse fracture of shaft of left femur (H) 12/14/2019    Other acute pulmonary embolism without acute cor pulmonale (H) 12/20/2019    Other pulmonary embolism without acute cor pulmonale (H)     Snowboarding accident        Past Surgical History:    Past Surgical History:   Procedure Laterality Date    ORTHOPEDIC SURGERY  12/14/19    Broke Femur, insert duy and pins.       Family History:    Family History   Problem Relation Age of Onset    Diabetes Father     Hypertension Father     Heart Disease Father     Diabetes Sister 35    Depression Sister     Anxiety Disorder Sister     Coronary Artery Disease Brother     Breast Cancer Maternal Grandmother     Lipids Maternal Grandmother     Thyroid Disease Paternal Grandmother        Social History:  Marital Status:   [2]  Social History     Tobacco Use    Smoking status: Never     Passive exposure: Yes    Smokeless tobacco: Never    Tobacco comments:     parents   Vaping Use    Vaping status: Never Used   Substance Use Topics    Alcohol use: Yes     Alcohol/week: 5.0 standard drinks of alcohol      Comment: 1-2 drinks a month    Drug use: No        Medications:    amoxicillin-clavulanate (AUGMENTIN) 875-125 MG tablet  azithromycin (ZITHROMAX Z-NOHEMY) 250 MG tablet          Review of Systems   All other systems reviewed and are negative.      Physical Exam   BP: (!) 132/98  Pulse: 80  Temp: 99.2  F (37.3  C)  Resp: 20  Weight: 85.3 kg (188 lb)  SpO2: 95 %      Physical Exam  Constitutional:       General: He is in acute distress (mild).   HENT:      Mouth/Throat:      Mouth: Mucous membranes are moist.      Pharynx: Oropharynx is clear.   Cardiovascular:      Rate and Rhythm: Normal rate and regular rhythm.      Pulses: Normal pulses.   Pulmonary:      Effort: Pulmonary effort is normal.      Breath sounds: Rales (right mid/base posteriorly) present.   Abdominal:      Palpations: Abdomen is soft.      Tenderness: There is abdominal tenderness (mild LLQ). There is no guarding or rebound.   Musculoskeletal:      Right lower leg: No edema.      Left lower leg: No edema.   Skin:     General: Skin is warm and dry.   Neurological:      General: No focal deficit present.      Mental Status: He is alert and oriented to person, place, and time.         ED Course        Procedures              Critical Care time:  none              Results for orders placed or performed during the hospital encounter of 10/14/24 (from the past 24 hour(s))   CBC with platelets differential    Narrative    The following orders were created for panel order CBC with platelets differential.  Procedure                               Abnormality         Status                     ---------                               -----------         ------                     CBC with platelets and d...[[id number]]                      Final result                 Please view results for these tests on the individual orders.   Comprehensive metabolic panel   Result Value Ref Range    Sodium 135 135 - 145 mmol/L    Potassium 4.2 3.4 - 5.3 mmol/L    Carbon  Dioxide (CO2) 25 22 - 29 mmol/L    Anion Gap 12 7 - 15 mmol/L    Urea Nitrogen 14.2 6.0 - 20.0 mg/dL    Creatinine 0.90 0.67 - 1.17 mg/dL    GFR Estimate >90 >60 mL/min/1.73m2    Calcium 9.0 8.8 - 10.4 mg/dL    Chloride 98 98 - 107 mmol/L    Glucose 108 (H) 70 - 99 mg/dL    Alkaline Phosphatase 73 40 - 150 U/L    AST 31 0 - 45 U/L    ALT 33 0 - 70 U/L    Protein Total 7.4 6.4 - 8.3 g/dL    Albumin 3.8 3.5 - 5.2 g/dL    Bilirubin Total 0.3 <=1.2 mg/dL   CRP inflammation   Result Value Ref Range    CRP Inflammation 63.32 (H) <5.00 mg/L   Erythrocyte sedimentation rate auto   Result Value Ref Range    Erythrocyte Sedimentation Rate 29 (H) 0 - 15 mm/hr   CBC with platelets and differential   Result Value Ref Range    WBC Count 6.6 4.0 - 11.0 10e3/uL    RBC Count 5.08 4.40 - 5.90 10e6/uL    Hemoglobin 14.7 13.3 - 17.7 g/dL    Hematocrit 42.8 40.0 - 53.0 %    MCV 84 78 - 100 fL    MCH 28.9 26.5 - 33.0 pg    MCHC 34.3 31.5 - 36.5 g/dL    RDW 12.5 10.0 - 15.0 %    Platelet Count 210 150 - 450 10e3/uL    % Neutrophils 59 %    % Lymphocytes 20 %    % Monocytes 18 %    % Eosinophils 3 %    % Basophils 1 %    % Immature Granulocytes 0 %    NRBCs per 100 WBC 0 <1 /100    Absolute Neutrophils 3.8 1.6 - 8.3 10e3/uL    Absolute Lymphocytes 1.3 0.8 - 5.3 10e3/uL    Absolute Monocytes 1.2 0.0 - 1.3 10e3/uL    Absolute Eosinophils 0.2 0.0 - 0.7 10e3/uL    Absolute Basophils 0.0 0.0 - 0.2 10e3/uL    Absolute Immature Granulocytes 0.0 <=0.4 10e3/uL    Absolute NRBCs 0.0 10e3/uL   Mononucleosis screen   Result Value Ref Range    Mononucleosis Screen Negative Negative   XR Chest Port 1 View    Narrative    EXAM: XR CHEST PORT 1 VIEW  LOCATION: [facility]  DATE: 10/14/2024    INDICATION: Cough and fever > 1week. Crackles on Right, suspect pneumonia.  COMPARISON: Chest x-ray 2 views 9/24/2020 at 0737 hours.      Impression    IMPRESSION: New airspace infiltrate in the right lower lung medially, likely  representing an infectious or an inflammatory process, corresponding to clinical suspicion. Left lung clear. No adenopathy or effusion. Normal cardiac size and pulmonary   vascularity. Anatomic alignment of the bones and joints.       Medications   cefTRIAXone (ROCEPHIN) 2 g vial to attach to  ml bag for ADULTS or NS 50 ml bag for PEDS (2 g Intravenous $New Bag 10/14/24 5567)   ketorolac (TORADOL) injection 15 mg (15 mg Intravenous $Given 10/14/24 8474)       Assessments & Plan (with Medical Decision Making)  34-year-old has been sick for over a week with fever, body aches and cough which was productive but is now more dry.  In general good health.  Appetite down but he is able to drink.  Vitals are stable.  He has some crackles in the right mid and lower lung posteriorly.  Suspect he probably has pneumonia.  White count surprisingly normal at 6.6 with 18% monocytes.  Monoscreen was negative.  CRP and sed rate are up a bit.  Comprehensive profile unremarkable.  Chest x-ray shows an infiltrate in the right lower lobe medially which corresponds with his clinical findings.  He was given Rocephin 2 g IV in the ED and prescription for Augmentin and Zithromax.  He is otherwise in good health and will most likely be able to be managed as an outpatient.  We discussed reportable signs and when to return.  Verbal and written discharge instructions given.  He is comfortable with this plan.     I have reviewed the nursing notes.    I have reviewed the findings, diagnosis, plan and need for follow up with the patient.           Medical Decision Making  The patient's presentation was of moderate complexity (an undiagnosed new problem with uncertain prognosis).    The patient's evaluation involved:  ordering and/or review of 3+ test(s) in this encounter (see separate area of note for details)    The patient's management necessitated moderate risk (prescription drug management including medications given in the  ED).        New Prescriptions    AMOXICILLIN-CLAVULANATE (AUGMENTIN) 875-125 MG TABLET    Take 1 tablet by mouth 2 times daily for 10 days.    AZITHROMYCIN (ZITHROMAX Z-NOHEMY) 250 MG TABLET    Two tablets on the first day, then one tablet daily for the next 4 days       Final diagnoses:   Pneumonia of right lower lobe due to infectious organism       10/14/2024   [facility] EMERGENCY DEPT       Brian Lara MD  10/14/24 0658

## 2024-10-15 NOTE — ED PROVIDER NOTES
History     Chief Complaint   Patient presents with    Shortness of Breath    Cough    Chest Wall Pain     HPI  Burton Pillai is a 34 year old male who presenting for concerns of painful cough.  Patient was seen earlier today here by the on-call ER physician and ultimately was found to have a right lower lobe pneumonia and was started on IV antibiotics after discharged on oral antibiotics.  He notes he has continued to have chest pain today and has not gotten better with his antibiotics that he received today.  He is concerned that he might have a pulmonary embolism as he had 2020 after breaking his femur.  He notes he also had a pulse ox in his finger and his oxygen dropped down to 90% at home and wanted to be reevaluated.      Mentation below copied from Dr. Darby earlier assessment  Vitals are stable.  He has some crackles in the right mid and lower lung posteriorly.  Suspect he probably has pneumonia.  White count surprisingly normal at 6.6 with 18% monocytes.  Monoscreen was negative.  CRP and sed rate are up a bit.  Comprehensive profile unremarkable.  Chest x-ray shows an infiltrate in the right lower lobe medially which corresponds with his clinical findings.  He was given Rocephin 2 g IV in the ED and prescription for Augmentin and Zithromax.  He is otherwise in good health and will most likely be able to be managed as an outpatient.  We discussed reportable signs and when to return.  Verbal and written discharge instructions given.  He is comfortable with this plan.    Allergies:  Allergies   Allergen Reactions    No Known Drug Allergy        Problem List:    Patient Active Problem List    Diagnosis Date Noted    Acne 08/19/2010     Priority: Medium        Past Medical History:    Past Medical History:   Diagnosis Date    Closed displaced transverse fracture of shaft of left femur (H) 12/14/2019    Other acute pulmonary embolism without acute cor pulmonale (H) 12/20/2019    Other pulmonary  "embolism without acute cor pulmonale (H)     Snowboarding accident        Past Surgical History:    Past Surgical History:   Procedure Laterality Date    ORTHOPEDIC SURGERY  12/14/19    Broke Femur, insert duy and pins.       Family History:    Family History   Problem Relation Age of Onset    Diabetes Father     Hypertension Father     Heart Disease Father     Diabetes Sister 35    Depression Sister     Anxiety Disorder Sister     Coronary Artery Disease Brother     Breast Cancer Maternal Grandmother     Lipids Maternal Grandmother     Thyroid Disease Paternal Grandmother        Social History:  Marital Status:   [2]  Social History     Tobacco Use    Smoking status: Never     Passive exposure: Yes    Smokeless tobacco: Never    Tobacco comments:     parents   Vaping Use    Vaping status: Never Used   Substance Use Topics    Alcohol use: Yes     Alcohol/week: 5.0 standard drinks of alcohol     Comment: 1-2 drinks a month    Drug use: No        Medications:    amoxicillin-clavulanate (AUGMENTIN) 875-125 MG tablet  azithromycin (ZITHROMAX Z-NOHEMY) 250 MG tablet          Review of Systems   Respiratory:  Positive for cough and shortness of breath.    Cardiovascular:  Positive for chest pain.   All other systems reviewed and are negative.      Physical Exam   BP: (!) 158/96  Pulse: 65  Temp: 98.2  F (36.8  C)  Resp: 22  Height: 185.4 cm (6' 1\")  Weight: 85.4 kg (188 lb 4.8 oz)  SpO2: 97 %      Physical Exam  Vitals and nursing note reviewed.   Constitutional:       General: He is not in acute distress.     Appearance: Normal appearance. He is not toxic-appearing.   HENT:      Head: Normocephalic and atraumatic.   Eyes:      General: No scleral icterus.     Conjunctiva/sclera: Conjunctivae normal.      Pupils: Pupils are equal, round, and reactive to light.   Cardiovascular:      Rate and Rhythm: Normal rate.      Heart sounds: Normal heart sounds.   Pulmonary:      Effort: Pulmonary effort is normal. No " respiratory distress.      Breath sounds: Examination of the right-middle field reveals rales. Examination of the right-lower field reveals rales. Rales present.   Abdominal:      Palpations: Abdomen is soft.      Tenderness: There is no abdominal tenderness.   Musculoskeletal:         General: No deformity.      Cervical back: Neck supple.   Skin:     General: Skin is warm.   Neurological:      General: No focal deficit present.      Mental Status: He is alert and oriented to person, place, and time.         ED Course        Procedures                Results for orders placed or performed during the hospital encounter of 10/14/24 (from the past 24 hour(s))   D dimer quantitative   Result Value Ref Range    D-Dimer Quantitative 0.59 (H) 0.00 - 0.50 ug/mL FEU    Narrative    This D-dimer assay is intended for use in conjunction with a clinical pretest probability assessment model to exclude pulmonary embolism (PE) and deep venous thrombosis (DVT) in outpatients suspected of PE or DVT. The cut-off value is 0.50 ug/mL FEU.   CT Chest Pulmonary Embolism w Contrast    Narrative    EXAM: CT CHEST PULMONARY EMBOLISM W CONTRAST  LOCATION: [facility]  DATE: 10/14/2024    INDICATION: elevated dimer  COMPARISON: Chest radiograph performed earlier on 10/14/2024  TECHNIQUE: CT chest pulmonary angiogram during arterial phase injection of IV contrast. Multiplanar reformats and MIP reconstructions were performed. Dose reduction techniques were used.   CONTRAST: Isovue 370,  64ml    FINDINGS:  ANGIOGRAM CHEST: Pulmonary arteries are normal caliber and negative for pulmonary emboli. Thoracic aorta is negative for dissection. No CT evidence of right heart strain.    LUNGS AND PLEURA: Airway thickening to the right lower lobe where there is a large area of consolidation with surrounding patchy groundglass opacities. Additional patchy groundglass opacities in the right middle lobe. The left upper and left  lower lobes   are clear. No pneumothorax or pleural effusion.    MEDIASTINUM/AXILLAE: Mildly enlarged mediastinal and right hilar lymph node should be reactive.    CORONARY ARTERY CALCIFICATION: None.    UPPER ABDOMEN: Mild splenomegaly (13.5 cm). Focal steatosis near the fissure for the falciform ligament.    MUSCULOSKELETAL: Unremarkable.      Impression    IMPRESSION:  1.  No pulmonary emboli.  2.  Findings suspicious for pneumonia involving the right middle and right lower lobes with a large area of consolidation in the right lower lobe.  3.  Mildly enlarged mediastinal and right hilar lymph nodes should be reactive.  4.  Mild splenomegaly.       Medications   iopamidol (ISOVUE-370) solution 500 mL (64 mLs Intravenous $Given 10/14/24 2051)   Saline 100mL Bag (70 mLs Intravenous $Given 10/14/24 2051)       Assessments & Plan (with Medical Decision Making)     I have reviewed the nursing notes.    I have reviewed the findings, diagnosis, plan and need for follow up with the patient.      Medical Decision Making  34-year-old male presenting for week of illness with fevers body aches and a cough and found to have a right lower lobe pneumonia earlier today with chest imaging.  Lab work was reassuring.  His vitals are stable again on arrival with a blood pressure of 158/96 and temperature of 98.2 and oxygen saturation of 97% on room air with no tachycardia.  He was provided with 2 g of IV Rocephin earlier today as well as Augmentin and azithromycin on discharge.  He notes that he had a PE before and is significantly anxious in regards to this.  He notes his oxygen dropped down to 90 at home on his finger oxygen monitor  After being elevated at 95%.  Explained to extensively that these oxygen monitors need appropriate waveform and that could have just been the abnormal waveform as his oxygen saturation heart rate and blood pressures are reassuring for high unlikelihood of a pulmonary embolism.  Explained to him that  his symptoms are much more consistent with his active pneumonia and symptoms do not improve after 1 dose and often take 2 to 3 days until improvement occurs.  Due to patient's significant anxiety and wanting to pursue ruling out a pulmonary embolism we finally came to agreement to evaluate a D-dimer even though it is likely that it might be elevated due to patient's active infection and that he may require unnecessary radiation if this is elevated.  He is okay with this plan.    Dimer returned back at 0.59 therefore proceeded with PE study which was negative but did find large right lower lobe pneumonia consistent with patient's active diagnoses and current active biotic use is appropriate.  No other acute concerns at this time patient discharged home with wife.    New Prescriptions    No medications on file       Final diagnoses:   Community acquired pneumonia of right lower lobe of lung       10/14/2024   [facility] EMERGENCY DEPT       Eddie Morales MD  10/14/24 [id number]

## 2024-10-15 NOTE — DISCHARGE INSTRUCTIONS
Your CT imaging did not show any acute signs of pulmonary embolism.  There was a large area of right lower lobe pneumonia consistent with the x-rays.  Continue antibiotics and follow-up primary care doctor in 2 to 3 days.

## 2025-03-25 SDOH — HEALTH STABILITY: PHYSICAL HEALTH: ON AVERAGE, HOW MANY DAYS PER WEEK DO YOU ENGAGE IN MODERATE TO STRENUOUS EXERCISE (LIKE A BRISK WALK)?: 6 DAYS

## 2025-03-25 SDOH — HEALTH STABILITY: PHYSICAL HEALTH: ON AVERAGE, HOW MANY MINUTES DO YOU ENGAGE IN EXERCISE AT THIS LEVEL?: 60 MIN

## 2025-03-25 ASSESSMENT — SOCIAL DETERMINANTS OF HEALTH (SDOH): HOW OFTEN DO YOU GET TOGETHER WITH FRIENDS OR RELATIVES?: ONCE A WEEK

## 2025-03-26 ENCOUNTER — OFFICE VISIT (OUTPATIENT)
Dept: FAMILY MEDICINE | Facility: CLINIC | Age: 35
End: 2025-03-26
Payer: MEDICAID

## 2025-03-26 VITALS
HEIGHT: 73 IN | BODY MASS INDEX: 25.05 KG/M2 | OXYGEN SATURATION: 97 % | DIASTOLIC BLOOD PRESSURE: 78 MMHG | RESPIRATION RATE: 18 BRPM | WEIGHT: 189 LBS | SYSTOLIC BLOOD PRESSURE: 116 MMHG | HEART RATE: 61 BPM | TEMPERATURE: 97.5 F

## 2025-03-26 DIAGNOSIS — Z13.29 SCREENING FOR THYROID DISORDER: ICD-10-CM

## 2025-03-26 DIAGNOSIS — Z13.220 LIPID SCREENING: ICD-10-CM

## 2025-03-26 DIAGNOSIS — R06.83 SNORING: ICD-10-CM

## 2025-03-26 DIAGNOSIS — Z00.00 ROUTINE GENERAL MEDICAL EXAMINATION AT A HEALTH CARE FACILITY: Primary | ICD-10-CM

## 2025-03-26 DIAGNOSIS — R73.01 ELEVATED FASTING BLOOD SUGAR: ICD-10-CM

## 2025-03-26 DIAGNOSIS — E55.9 VITAMIN D DEFICIENCY: ICD-10-CM

## 2025-03-26 LAB
CHOLEST SERPL-MCNC: 229 MG/DL
EST. AVERAGE GLUCOSE BLD GHB EST-MCNC: 111 MG/DL
FASTING STATUS PATIENT QL REPORTED: YES
HBA1C MFR BLD: 5.5 %
HDLC SERPL-MCNC: 42 MG/DL
LDLC SERPL CALC-MCNC: 154 MG/DL
NONHDLC SERPL-MCNC: 187 MG/DL
T4 FREE SERPL-MCNC: 1.01 NG/DL (ref 0.9–1.7)
TRIGL SERPL-MCNC: 167 MG/DL
TSH SERPL DL<=0.005 MIU/L-ACNC: 4.88 UIU/ML (ref 0.3–4.2)
VIT D+METAB SERPL-MCNC: 20 NG/ML (ref 20–50)

## 2025-03-26 PROCEDURE — 80061 LIPID PANEL: CPT | Performed by: PHYSICIAN ASSISTANT

## 2025-03-26 PROCEDURE — 99213 OFFICE O/P EST LOW 20 MIN: CPT | Mod: 25 | Performed by: PHYSICIAN ASSISTANT

## 2025-03-26 PROCEDURE — 83036 HEMOGLOBIN GLYCOSYLATED A1C: CPT | Performed by: PHYSICIAN ASSISTANT

## 2025-03-26 PROCEDURE — 3074F SYST BP LT 130 MM HG: CPT | Performed by: PHYSICIAN ASSISTANT

## 2025-03-26 PROCEDURE — 99395 PREV VISIT EST AGE 18-39: CPT | Performed by: PHYSICIAN ASSISTANT

## 2025-03-26 PROCEDURE — 36415 COLL VENOUS BLD VENIPUNCTURE: CPT | Performed by: PHYSICIAN ASSISTANT

## 2025-03-26 PROCEDURE — 84439 ASSAY OF FREE THYROXINE: CPT | Performed by: PHYSICIAN ASSISTANT

## 2025-03-26 PROCEDURE — 1126F AMNT PAIN NOTED NONE PRSNT: CPT | Performed by: PHYSICIAN ASSISTANT

## 2025-03-26 PROCEDURE — 82306 VITAMIN D 25 HYDROXY: CPT | Performed by: PHYSICIAN ASSISTANT

## 2025-03-26 PROCEDURE — 3078F DIAST BP <80 MM HG: CPT | Performed by: PHYSICIAN ASSISTANT

## 2025-03-26 PROCEDURE — 84443 ASSAY THYROID STIM HORMONE: CPT | Performed by: PHYSICIAN ASSISTANT

## 2025-03-26 ASSESSMENT — PAIN SCALES - GENERAL: PAINLEVEL_OUTOF10: NO PAIN (0)

## 2025-03-26 NOTE — PROGRESS NOTES
Preventive Care Visit  [facility]  Chiara Watts PA-C, Family Medicine  Mar 26, 2025        Subjective   Maverick is a 34 year old, presenting for the following:  Physical        3/26/2025     7:42 AM   Additional Questions   Roomed by Luz Elena DON  Overall doing well. Notes that no matter how much sleep at night he gets he always feels tired. Worries a bit about diabetes. This does run in his family. Wife also reports that he snores. Both dad and brother with sleep apnea.      Advance Care Planning  Patient does not have a Health Care Directive: Discussed advance care planning with patient; information given to patient to review.      3/25/2025   General Health   How would you rate your overall physical health? Good   Feel stress (tense, anxious, or unable to sleep) Rather much   (!) STRESS CONCERN      3/25/2025   Nutrition   Three or more servings of calcium each day? Yes   Diet: Regular (no restrictions)   How many servings of fruit and vegetables per day? (!) 0-1   How many sweetened beverages each day? (!) 2         3/25/2025   Exercise   Days per week of moderate/strenous exercise 6 days   Average minutes spent exercising at this level 60 min         3/25/2025   Social Factors   Frequency of gathering with friends or relatives Once a week   Worry food won't last until get money to buy more No   Food not last or not have enough money for food? No   Do you have housing? (Housing is defined as stable permanent housing and does not include staying ouside in a car, in a tent, in an abandoned building, in an overnight shelter, or couch-surfing.) Yes   Are you worried about losing your housing? No   Lack of transportation? No   Unable to get utilities (heat,electricity)? No         3/25/2025   Dental   Dentist two times every year? (!) NO           Today's PHQ-2 Score:       3/25/2025     6:02 PM   PHQ-2 ( 1999 [employer])   Q1: Little interest or pleasure in doing things 1   Q2:  "Feeling down, depressed or hopeless 1   PHQ-2 Score 2    Q1: Little interest or pleasure in doing things Several days   Q2: Feeling down, depressed or hopeless Several days   PHQ-2 Score 2       Patient-reported           3/25/2025   Substance Use   Alcohol more than 3/day or more than 7/wk No   Do you use any other substances recreationally? No     Social History     Tobacco Use    Smoking status: Never     Passive exposure: Yes    Smokeless tobacco: Never    Tobacco comments:     parents   Vaping Use    Vaping status: Never Used   Substance Use Topics    Alcohol use: Yes     Alcohol/week: 5.0 standard drinks of alcohol     Comment: 1-2 drinks a month    Drug use: No           3/25/2025   STI Screening   New sexual partner(s) since last STI/HIV test? No         3/25/2025   Contraception/Family Planning   Questions about contraception or family planning (!) YES         Reviewed and updated as needed this visit by Provider   Tobacco  Allergies  Meds  Problems  Med Hx  Surg Hx  Fam Hx              Review of Systems  Constitutional, HEENT, cardiovascular, pulmonary, GI, , musculoskeletal, neuro, skin, endocrine and psych systems are negative, except as otherwise noted.     Objective    Exam  /78   Pulse 61   Temp 97.5  F (36.4  C) (Temporal)   Resp 18   Ht 1.865 m (6' 1.43\")   Wt 85.7 kg (189 lb)   SpO2 97%   BMI 24.65 kg/m     Estimated body mass index is 24.65 kg/m  as calculated from the following:    Height as of this encounter: 1.865 m (6' 1.43\").    Weight as of this encounter: 85.7 kg (189 lb).    Physical Exam  GENERAL: alert and no distress  EYES: Eyes grossly normal to inspection, PERRL and conjunctivae and sclerae normal  HENT: ear canals and TM's normal, nose and mouth without ulcers or lesions  NECK: no adenopathy, no asymmetry, masses, or scars  RESP: lungs clear to auscultation - no rales, rhonchi or wheezes  CV: regular rate and rhythm, normal S1 S2, no S3 or S4, no murmur, click " or rub, no peripheral edema  ABDOMEN: soft, nontender, no hepatosplenomegaly, no masses and bowel sounds normal  MS: no gross musculoskeletal defects noted, no edema  SKIN: no suspicious lesions or rashes  NEURO: Normal strength and tone, mentation intact and speech normal  PSYCH: mentation appears normal, affect normal/bright      Assessment & Plan     Routine general medical examination at a health care facility  Vaccinations reviewed and declined at this time.    Snoring  Recommended consult with sleep medicine. Referral placed today. Labs also ordered as below to evaluate for daytime fatigue.   - Adult Sleep Eval & Management  Referral; Future    Elevated fasting blood sugar  - Hemoglobin A1c; Future  - Hemoglobin A1c    Screening for thyroid disorder  - TSH with free T4 reflex; Future  - TSH with free T4 reflex    Vitamin D deficiency  - Vitamin D Deficiency; Future  - Vitamin D Deficiency    Lipid screening  - Lipid panel reflex to direct LDL Fasting; Future  - Lipid panel reflex to direct LDL Fasting    Patient has been advised of split billing requirements and indicates understanding: Yes    Counseling  Appropriate preventive services were addressed with this patient via screening, questionnaire, or discussion as appropriate for fall prevention, nutrition, physical activity, Tobacco-use cessation, social engagement, weight loss and cognition.  Checklist reviewing preventive services available has been given to the patient.  Reviewed patient's diet, addressing concerns and/or questions.   The patient was instructed to see the dentist every 6 months.   He is at risk for psychosocial distress and has been provided with information to reduce risk.       Signed Electronically by: Chiara Watts PA-C

## 2025-03-26 NOTE — PATIENT INSTRUCTIONS
Patient Education   Preventive Care Advice   This is general advice given by our system to help you stay healthy. However, your care team may have specific advice just for you. Please talk to your care team about your preventive care needs.  Nutrition  Eat 5 or more servings of fruits and vegetables each day.  Try wheat bread, brown rice and whole grain pasta (instead of white bread, rice, and pasta).  Get enough calcium and vitamin D. Check the label on foods and aim for 100% of the RDA (recommended daily allowance).  Lifestyle  Exercise at least 150 minutes each week  (30 minutes a day, 5 days a week).  Do muscle strengthening activities 2 days a week. These help control your weight and prevent disease.  No smoking.  Wear sunscreen to prevent skin cancer.  Have a dental exam and cleaning every 6 months.  Yearly exams  See your health care team every year to talk about:  Any changes in your health.  Any medicines your care team has prescribed.  Preventive care, family planning, and ways to prevent chronic diseases.  Shots (vaccines)   HPV shots (up to age 26), if you've never had them before.  Hepatitis B shots (up to age 59), if you've never had them before.  COVID-19 shot: Get this shot when it's due.  Flu shot: Get a flu shot every year.  Tetanus shot: Get a tetanus shot every 10 years.  Pneumococcal, hepatitis A, and RSV shots: Ask your care team if you need these based on your risk.  Shingles shot (for age 50 and up)  General health tests  Diabetes screening:  Starting at age 35, Get screened for diabetes at least every 3 years.  If you are younger than age 35, ask your care team if you should be screened for diabetes.  Cholesterol test: At age 39, start having a cholesterol test every 5 years, or more often if advised.  Bone density scan (DEXA): At age 50, ask your care team if you should have this scan for osteoporosis (brittle bones).  Hepatitis C: Get tested at least once in your life.  STIs (sexually  transmitted infections)  Before age 24: Ask your care team if you should be screened for STIs.  After age 24: Get screened for STIs if you're at risk. You are at risk for STIs (including HIV) if:  You are sexually active with more than one person.  You don't use condoms every time.  You or a partner was diagnosed with a sexually transmitted infection.  If you are at risk for HIV, ask about PrEP medicine to prevent HIV.  Get tested for HIV at least once in your life, whether you are at risk for HIV or not.  Cancer screening tests  Cervical cancer screening: If you have a cervix, begin getting regular cervical cancer screening tests starting at age 21.  Breast cancer scan (mammogram): If you've ever had breasts, begin having regular mammograms starting at age 40. This is a scan to check for breast cancer.  Colon cancer screening: It is important to start screening for colon cancer at age 45.  Have a colonoscopy test every 10 years (or more often if you're at risk) Or, ask your provider about stool tests like a FIT test every year or Cologuard test every 3 years.  To learn more about your testing options, visit:   .  For help making a decision, visit:   https://bit.ly/xw47639.  Prostate cancer screening test: If you have a prostate, ask your care team if a prostate cancer screening test (PSA) at age 55 is right for you.  Lung cancer screening: If you are a current or former smoker ages 50 to 80, ask your care team if ongoing lung cancer screenings are right for you.  For informational purposes only. Not to replace the advice of your health care provider. Copyright   2023 [facility]. All rights reserved. Clinically reviewed by the [facility]. [employer] [id number] - REV 01/24.  Learning About Stress  What is stress?     Stress is your body's response to a hard situation. Your body can have a physical, emotional, or mental response. Stress is a fact of life for most people, and it  affects everyone differently. What causes stress for you may not be stressful for someone else.  A lot of things can cause stress. You may feel stress when you go on a job interview, take a test, or run a race. This kind of short-term stress is normal and even useful. It can help you if you need to work hard or react quickly. For example, stress can help you finish an important job on time.  Long-term stress is caused by ongoing stressful situations or events. Examples of long-term stress include long-term health problems, ongoing problems at work, or conflicts in your family. Long-term stress can harm your health.  How does stress affect your health?  When you are stressed, your body responds as though you are in danger. It makes hormones that speed up your heart, make you breathe faster, and give you a burst of energy. This is called the fight-or-flight stress response. If the stress is over quickly, your body goes back to normal and no harm is done.  But if stress happens too often or lasts too long, it can have bad effects. Long-term stress can make you more likely to get sick, and it can make symptoms of some diseases worse. If you tense up when you are stressed, you may develop neck, shoulder, or low back pain. Stress is linked to high blood pressure and heart disease.  Stress also harms your emotional health. It can make you hall, tense, or depressed. Your relationships may suffer, and you may not do well at work or school.  What can you do to manage stress?  You can try these things to help manage stress:   Do something active. Exercise or activity can help reduce stress. Walking is a great way to get started. Even everyday activities such as housecleaning or yard work can help.  Try yoga or ivy chi. These techniques combine exercise and meditation. You may need some training at first to learn them.  Do something you enjoy. For example, listen to music or go to a movie. Practice your hobby or do volunteer  "work.  Meditate. This can help you relax, because you are not worrying about what happened before or what may happen in the future.  Do guided imagery. Imagine yourself in any setting that helps you feel calm. You can use online videos, books, or a teacher to guide you.  Do breathing exercises. For example:  From a standing position, bend forward from the waist with your knees slightly bent. Let your arms dangle close to the floor.  Breathe in slowly and deeply as you return to a standing position. Roll up slowly and lift your head last.  Hold your breath for just a few seconds in the standing position.  Breathe out slowly and bend forward from the waist.  Let your feelings out. Talk, laugh, cry, and express anger when you need to. Talking with supportive friends or family, a counselor, or a mayra leader about your feelings is a healthy way to relieve stress. Avoid discussing your feelings with people who make you feel worse.  Write. It may help to write about things that are bothering you. This helps you find out how much stress you feel and what is causing it. When you know this, you can find better ways to cope.  What can you do to prevent stress?  You might try some of these things to help prevent stress:  Manage your time. This helps you find time to do the things you want and need to do.  Get enough sleep. Your body recovers from the stresses of the day while you are sleeping.  Get support. Your family, friends, and community can make a difference in how you experience stress.  Limit your news feed. Avoid or limit time on social media or news that may make you feel stressed.  Do something active. Exercise or activity can help reduce stress. Walking is a great way to get started.  Where can you learn more?  Go to https://www.[employer].net/patiented  Enter N032 in the search box to learn more about \"Learning About Stress.\"  Current as of: October 24, 2024  Content Version: 14.4 2024-2025 [employer].   Care instructions adapted under license by your healthcare professional. If you have questions about a medical condition or this instruction, always ask your healthcare professional. [employer] disclaims any warranty or liability for your use of this information.

## 2025-04-30 ENCOUNTER — VIRTUAL VISIT (OUTPATIENT)
Dept: UROLOGY | Facility: CLINIC | Age: 35
End: 2025-04-30
Payer: COMMERCIAL

## 2025-04-30 VITALS — BODY MASS INDEX: 25.18 KG/M2 | WEIGHT: 190 LBS | HEIGHT: 73 IN

## 2025-04-30 DIAGNOSIS — Z30.09 VASECTOMY EVALUATION: Primary | ICD-10-CM

## 2025-04-30 PROCEDURE — 98001 SYNCH AUDIO-VIDEO NEW LOW 30: CPT | Performed by: UROLOGY

## 2025-04-30 PROCEDURE — 1126F AMNT PAIN NOTED NONE PRSNT: CPT | Mod: 95 | Performed by: UROLOGY

## 2025-04-30 ASSESSMENT — PAIN SCALES - GENERAL: PAINLEVEL_OUTOF10: NO PAIN (0)

## 2025-04-30 NOTE — PROGRESS NOTES
Maverick is a 34 year old who is being evaluated via a billable video visit.    How would you like to obtain your AVS? MyChart  If the video visit is dropped, the invitation should be resent by: Text to cell phone: [phone]  Will anyone else be joining your video visit? No          Subjective   Maverick is a 34 year old, presenting for the following health issues:  No chief complaint on file.    Video Start Time:     HPI      Patient is a pleasant 34-year-old male who was seen for a consultation about vasectomy.  He has 3 kids.      Review of Systems  Constitutional, neuro, ENT, endocrine, pulmonary, cardiac, gastrointestinal, genitourinary, musculoskeletal, integument and psychiatric systems are negative, except as otherwise noted.      Objective    Vitals - Patient Reported  Pain Score: No Pain (0)        Physical Exam   GENERAL: alert and no distress  EYES: Eyes grossly normal to inspection.  No discharge or erythema, or obvious scleral/conjunctival abnormalities.  RESP: No audible wheeze, cough, or visible cyanosis.    SKIN: Visible skin clear. No significant rash, abnormal pigmentation or lesions.  NEURO: Cranial nerves grossly intact.  Mentation and speech appropriate for age.  PSYCH: Appropriate affect, tone, and pace of words    Discussed    That vasectomy is permanent method of birth control.    That vasectomy can fail due to recanalization of the vas even many months/years later.    That he needs 2 negative sperm checks to be considered sterile    That there are other methods that are not permanent and also that the sperm can be frozen for later use.    How the technique is performed, risks of infection, bleeding, damage to the testes vessels and testes atrophy    Long term complication such as chronic and difficult to treat testes pain and questionable increase incidence of prostate cancer    That the procedure can be done at the clinic or hospital OR        Plan:    Stop Aspirin  Will schedule Vasectomy in  the future      Video-Visit Details    Type of service:  Video Visit   Video End Time:  Originating Location (pt. Location): Home    Distant Location (provider location):  On-site  Platform used for Video Visit: Kishore  Signed Electronically by: Derek Estrada MD

## 2025-05-19 ENCOUNTER — OFFICE VISIT (OUTPATIENT)
Dept: UROLOGY | Facility: CLINIC | Age: 35
End: 2025-05-19
Payer: COMMERCIAL

## 2025-05-19 DIAGNOSIS — Z30.2 ENCOUNTER FOR VASECTOMY: Primary | ICD-10-CM

## 2025-05-19 PROCEDURE — 55250 REMOVAL OF SPERM DUCT(S): CPT | Performed by: UROLOGY

## 2025-05-19 NOTE — PROGRESS NOTES
Patient returns to clinic for vasectomy.  After informed consent has been obtained, he was placed supine in the OR table.  He was draped and prepped in usual surgical fashion.  2% lidocaine used for local anesthetics.  A scalpel less technique was used.  A small nick was made in the skin after vas identified/clamped.  Surrounding tissue was  from vas.  A small portion of vas was excised and sent for pathology to confirm cross section of lumen is visible.  Lumen of vas burned.  Vas tied with silk sutures after proximal portion closed.  3.0 chromic suture to close skin opening.  This was again repeated on the other side.    Post vas instructions given to patient today.

## 2025-05-24 ENCOUNTER — LAB (OUTPATIENT)
Dept: LAB | Facility: CLINIC | Age: 35
End: 2025-05-24
Payer: COMMERCIAL

## 2025-05-24 DIAGNOSIS — R79.89 ELEVATED TSH: ICD-10-CM

## 2025-05-24 LAB
T4 FREE SERPL-MCNC: 0.94 NG/DL (ref 0.9–1.7)
TSH SERPL DL<=0.005 MIU/L-ACNC: 9.78 UIU/ML (ref 0.3–4.2)

## 2025-05-24 PROCEDURE — 84443 ASSAY THYROID STIM HORMONE: CPT

## 2025-05-24 PROCEDURE — 36415 COLL VENOUS BLD VENIPUNCTURE: CPT

## 2025-05-24 PROCEDURE — 84439 ASSAY OF FREE THYROXINE: CPT

## 2025-05-26 ENCOUNTER — RESULTS FOLLOW-UP (OUTPATIENT)
Dept: FAMILY MEDICINE | Facility: CLINIC | Age: 35
End: 2025-05-26

## 2025-05-26 DIAGNOSIS — E03.4 HYPOTHYROIDISM DUE TO ACQUIRED ATROPHY OF THYROID: Primary | ICD-10-CM

## 2025-05-26 RX ORDER — LEVOTHYROXINE SODIUM 50 UG/1
50 TABLET ORAL
Qty: 90 TABLET | Refills: 0 | Status: SHIPPED | OUTPATIENT
Start: 2025-05-26

## 2025-05-31 LAB
PATH REPORT.COMMENTS IMP SPEC: NORMAL
PATH REPORT.COMMENTS IMP SPEC: NORMAL
PATH REPORT.FINAL DX SPEC: NORMAL
PATH REPORT.GROSS SPEC: NORMAL
PATH REPORT.MICROSCOPIC SPEC OTHER STN: NORMAL
PATH REPORT.RELEVANT HX SPEC: NORMAL
PHOTO IMAGE: NORMAL

## 2025-06-23 ASSESSMENT — SLEEP AND FATIGUE QUESTIONNAIRES
HOW LIKELY ARE YOU TO NOD OFF OR FALL ASLEEP WHILE SITTING QUIETLY AFTER LUNCH WITHOUT ALCOHOL: MODERATE CHANCE OF DOZING
HOW LIKELY ARE YOU TO NOD OFF OR FALL ASLEEP WHILE WATCHING TV: HIGH CHANCE OF DOZING
HOW LIKELY ARE YOU TO NOD OFF OR FALL ASLEEP IN A CAR, WHILE STOPPED FOR A FEW MINUTES IN TRAFFIC: WOULD NEVER DOZE
HOW LIKELY ARE YOU TO NOD OFF OR FALL ASLEEP WHILE SITTING INACTIVE IN A PUBLIC PLACE: SLIGHT CHANCE OF DOZING
HOW LIKELY ARE YOU TO NOD OFF OR FALL ASLEEP WHILE LYING DOWN TO REST IN THE AFTERNOON WHEN CIRCUMSTANCES PERMIT: MODERATE CHANCE OF DOZING
HOW LIKELY ARE YOU TO NOD OFF OR FALL ASLEEP WHEN YOU ARE A PASSENGER IN A CAR FOR AN HOUR WITHOUT A BREAK: SLIGHT CHANCE OF DOZING
HOW LIKELY ARE YOU TO NOD OFF OR FALL ASLEEP WHILE SITTING AND TALKING TO SOMEONE: WOULD NEVER DOZE
HOW LIKELY ARE YOU TO NOD OFF OR FALL ASLEEP WHILE SITTING AND READING: MODERATE CHANCE OF DOZING

## 2025-06-24 PROBLEM — F41.1 GENERALIZED ANXIETY DISORDER: Status: ACTIVE | Noted: 2025-06-24

## 2025-06-24 PROBLEM — E78.2 MIXED HYPERLIPIDEMIA: Status: ACTIVE | Noted: 2025-06-24

## 2025-06-24 PROBLEM — Z86.711 HISTORY OF PULMONARY EMBOLISM: Status: ACTIVE | Noted: 2025-06-24

## 2025-06-24 NOTE — PROGRESS NOTES
Outpatient Sleep Medicine Consultation:      Name: Burton Pillai MRN# [id number]   Age: 34 year old YOB: 1990     Date of Consultation: June 24, 2025  Consultation is requested by: Chiara Watts PA-C  [address] [place] [address] Chiara Watts  Primary care provider: Chiara Watts       Reason for Sleep Consult:     Burton Pillai is sent by Chiara Watts for a sleep consultation regarding snoring.    Patient s Reason for visit  Burton Pillai main reason for visit: (Patient-Rptd) My wife says she wakes up to me sometimes not breathing  Patient states problem(s) started: (Patient-Rptd) Earlier this year  Burton Pillai's goals for this visit: (Patient-Rptd) Hopefully be eligible for a sleep study           Assessment and Plan:     Impression/Plan:    ICD-10-CM    1. Suspected sleep apnea  R29.818 HST - Home Sleep Apnea Test - [facility] Non-Returnable      2. Apnea  R06.81 HST - Home Sleep Apnea Test - [facility] Non-Returnable      3. Snoring  R06.83 Adult Sleep Eval & Management  Referral     HST - Home Sleep Apnea Test - [facility] Non-Returnable      4. Chronic fatigue  R53.82 HST - Home Sleep Apnea Test - [facility] Non-Returnable      5. History of pulmonary embolism  Z86.711 HST - Home Sleep Apnea Test - [facility] Non-Returnable          Plans for Burton Pillai; includes:  A home sleep study.  Maverick has a STOP-BANG score of 5/8 with positives for snoring, fatigue, apneas, neck circumference, and gender.  He has comorbidities of snoring, apneas, chronic fatigue, and a history of pulmonary embolism.  This places him at a high pretest probability for obstructive sleep apnea.  I have asked that he follow-up with a [facility] message indicating he has completed his sleep study, so we may collaborate, discussing his sleep test results as they become available.  We will then update his plan of care accordingly.  Patient to follow-up in the sleep medicine department  "10-12 weeks after he completes his sleep study.  - In addition, recommend patient optimize their sleep schedule as well as sleep hygiene practices to mitigate any further sleep disruption.  - Recommend they employ safe driving practices such as not driving motor vehicle should they become drowsy.  - Recommend weight management to a BMI of 30.0 or less.    41 minutes spent with patient, all of which were spent face-to-face counseling, consulting, coordinating plan of care.  The longitudinal plan of care for the diagnosis(es)/condition(s) as documented were addressed during this visit. Due to the added complexity in care, I will continue to support Maverick in the subsequent management and with ongoing continuity of care.    KWAME Salas CNP         History of Present Illness:     Burton Pillai presents to the sleep medicine clinic with concerns of    Burton Pillai has a medical history which includes pulmonary embolism, generalized anxiety disorder, hypothyroidism, hyperlipidemia.    Father and brother both have sleep apnea. Dad was on CPAP, has since passed away d/t DM. Brother was \"stubborn\" and opted for no treatment. Also has since passed away, d/t ETOH.     Family history of diabetes, depression, coronary artery disease.    Anxiety wake up.  Wakes up suddenly and with the start.    May have 2-3 awakenings during sleep, for snort arousals, with 1-2 instances weekly of difficulty returning to sleep.  Out of bed to start his day at 4:45 AM.  Does use an alarm.    Prefers to sleep supine, or laterally with the head of the bed elevated.    Takes for weekly naps of 15-30-minute duration, after which she feels better.  May nap passively twice per week.    Suffers from socially disruptive snoring, snort arousals, witnessed apneas, morning mouth dryness, nocturnal leg movements, somniloquy, bruxism, pain at night, sore throat in the morning.    Tonsils: In    Neck Circumference: 41 cm    [place] " Sleepiness Scale  Total score - [place]:11   (Less than 10 normal)     Insomnia Severity Scale  ELLIOT Total Score: 11  (normal 0-7, mild 8-14, moderate 15-21, severe 22-28)    STOP-BANG score 5/8, placing him at high pretest probability for obstructive sleep apnea.  We discussed basic pathophysiology of obstructive sleep apnea as well as for central sleep apnea.  Discussed implications of untreated sleep apnea and reviewed surgical and nonsurgical treatments of sleep apnea.  Discussed sleep testing process.    Social History:    [employer] [employer]    Past Sleep Evaluations:  None    SLEEP-WAKE SCHEDULE:       Work/School Days: Patient goes to school/work: (Patient-Rptd) Yes   Usually gets into bed at (Patient-Rptd) 9:30-10:00  Takes patient about (Patient-Rptd) Pretty quick to fall asleep  Has trouble falling asleep (Patient-Rptd) 1-2 nights per week  Wakes up in the middle of the night (Patient-Rptd) 2-3 times.  Wakes up due to (Patient-Rptd) Snorting self awake  He has trouble falling back asleep (Patient-Rptd) 1-2 times a week.   It usually takes (Patient-Rptd) 15-20 min to get back to sleep  Patient is usually up at (Patient-Rptd) 4:45  Uses alarm: (Patient-Rptd) Yes    Weekends/Non-work Days/All Other Days:  Usually gets into bed at (Patient-Rptd) 11-12 am   Takes patient about (Patient-Rptd) Not very long to fall asleep  Patient is usually up at (Patient-Rptd) 7:30-8:00  Uses alarm: (Patient-Rptd) No    Sleep Need  Patient gets  (Patient-Rptd) 5-6 hours sleep on average   Patient thinks he needs about (Patient-Rptd) 8 sleep    Burton KAITLYNN Pillai prefers to sleep in this position(s): (Patient-Rptd) Back;Side;Head Elevated   Patient states they do the following activities in bed:      Naps  Patient takes a purposeful nap (Patient-Rptd) 4 times a week and naps are usually (Patient-Rptd) 15-30 min in duration  He feels better after a nap: (Patient-Rptd) Yes  He dozes off unintentionally (Patient-Rptd) 2 days  per week  Patient has had a driving accident or near-miss due to sleepiness/drowsiness: (Patient-Rptd) No      SLEEP DISRUPTIONS:    Breathing/Snoring  Patient snores:(Patient-Rptd) Yes  Other people complain about his snoring: (Patient-Rptd) Yes  Patient has been told he stops breathing in his sleep:(Patient-Rptd) Yes  He has issues with the following: (Patient-Rptd) Morning mouth dryness.    Movement:  Patient gets pain, discomfort, with an urge to move:  (Patient-Rptd) Yes restless legs symptoms  It happens when he is resting:  (Patient-Rptd) Yes  It happens more at night:  (Patient-Rptd) Yes  Patient has been told he kicks his legs at night:  (Patient-Rptd) No     Behaviors in Sleep:  Burton Pillai has experienced the following behaviors while sleeping: (Patient-Rptd) Sleep-talking;Teeth grinding  He has experienced sudden muscle weakness during the day: (Patient-Rptd) No  Pt denies yes bruxism, a little sleep talking, no sleep walking, and no dream enactment behavior. Pt denies no sleep paralysis, no hypnagogue and no cataplexy.       Is there anything else you would like your sleep provider to know:        CAFFEINE AND OTHER SUBSTANCES:    Patient consumes caffeinated beverages per day:  (Patient-Rptd) 1 wnergy drink and 2-3 sodas  Last caffeine use is usually: (Patient-Rptd) Evening   List of any prescribed or over the counter stimulants that patient takes:    List of any prescribed or over the counter sleep medication patient takes: (Patient-Rptd) None  List of previous sleep medications that patient has tried: (Patient-Rptd) None  Patient drinks alcohol to help them sleep: (Patient-Rptd) No  Patient drinks alcohol near bedtime: (Patient-Rptd) No    Family History:  Patient has a family member been diagnosed with a sleep disorder: (Patient-Rptd) Yes  (Patient-Rptd) My dad and brother were diagnose with sleep apnea         SCALES:    EPWORTH SLEEPINESS SCALE         6/23/2025    11:47 AM    [place]  Sleepiness Scale ( CHIQUITA Cates  [id number]<br>ESS - [place]/English - Final version - 21 Nov 07 - [facility] [place].)   Sitting and reading Moderate chance of dozing   Watching TV High chance of dozing   Sitting, inactive in a public place (e.g. a theatre or a meeting) Slight chance of dozing   As a passenger in a car for an hour without a break Slight chance of dozing   Lying down to rest in the afternoon when circumstances permit Moderate chance of dozing   Sitting and talking to someone Would never doze   Sitting quietly after a lunch without alcohol Moderate chance of dozing   In a car, while stopped for a few minutes in traffic Would never doze   [place] Score (MC) 11   [place] Score (Sleep) 11        Patient-reported         INSOMNIA SEVERITY INDEX (ELLIOT)          6/23/2025    11:36 AM   Insomnia Severity Index (ELLIOT)   Difficulty falling asleep 0   Difficulty staying asleep 1   Problems waking up too early 1   How SATISFIED/DISSATISFIED are you with your CURRENT sleep pattern? 2   How NOTICEABLE to others do you think your sleep problem is in terms of impairing the quality of your life? 2   How WORRIED/DISTRESSED are you about your current sleep problem? 2   To what extent do you consider your sleep problem to INTERFERE with your daily functioning (e.g. daytime fatigue, mood, ability to function at work/daily chores, concentration, memory, mood, etc.) CURRENTLY? 3   ELLIOT Total Score 11        Patient-reported       Guidelines for Scoring/Interpretation:  Total score categories:  0-7 = No clinically significant insomnia   8-14 = Subthreshold insomnia   15-21 = Clinical insomnia (moderate severity)  22-28 = Clinical insomnia (severe)  Used via courtesy of www.PocketGuideealth.va.gov with permission from Surjit Solorio PhD., [facility]      STOP BANG           6/23/2025    11:48 AM   STOP BANG Questionnaire (  2008, the American Society of Anesthesiologists, Inc. [employer]   1. Snoring -  "Do you snore loudly (louder than talking or loud enough to be heard through closed doors)? Yes   2. Tired - Do you often feel tired, fatigued, or sleepy during daytime? Yes   3. Observed - Has anyone observed you stop breathing during your sleep? Yes   4. Blood pressure - Do you have or are you being treated for high blood pressure? No   5. BMI - BMI more than 35 kg/m2? No   6. Age - Age over 50 yr old? No   7. Neck circumference - Neck circumference greater than 40 cm? No   8. Gender - Gender male? Yes   STOP BANG Score (MC): 4 (High risk of INOCENTE)         GAD7        12/19/2019     4:05 PM   ELSA-7    1. Feeling nervous, anxious, or on edge 2   2. Not being able to stop or control worrying 1   3. Worrying too much about different things 1   4. Trouble relaxing 0   5. Being so restless that it is hard to sit still 1   6. Becoming easily annoyed or irritable 2   7. Feeling afraid, as if something awful might happen 1   ELSA-7 Total Score 8         CAGE-AID         No data to display                CAGE-AID reprinted with permission from the [place] Medical Journal, RENATA Aleman. and LOC [place], \"Conjoint screening questionnaires for alcohol and drug abuse\" [place] Medical Journal 94: 135-140, 1995.      PATIENT HEALTH QUESTIONNAIRE-9 (PHQ - 9)        12/19/2019     4:06 PM   PHQ-9 ([employer])   1.  Little interest or pleasure in doing things 0   2.  Feeling down, depressed, or hopeless 1   3.  Trouble falling or staying asleep, or sleeping too much 0   4.  Feeling tired or having little energy 0   5.  Poor appetite or overeating 0   6.  Feeling bad about yourself - or that you are a failure or have let yourself or your family down 0   7.  Trouble concentrating on things, such as reading the newspaper or watching television 1   8.  Moving or speaking so slowly that other people could have noticed. Or the opposite - being so fidgety or restless that you have been moving around a lot more than usual 0   9.  Thoughts that " you would be better off dead, or of hurting yourself in some way 0    PHQ-9 Total Score 2   6.  Feeling bad about yourself 0   7.  Trouble concentrating 1   8.  Moving slowly or restless 0   9.  Suicidal or self-harm thoughts 0    1.  Little interest or pleasure in doing things Not at all   2.  Feeling down, depressed, or hopeless Several days   3.  Trouble falling or staying asleep, or sleeping too much Not at all   4.  Feeling tired or having little energy Not at all   5.  Poor appetite or overeating Not at all   6.  Feeling bad about yourself Not at all   7.  Trouble concentrating Several days   8.  Moving slowly or restless Not at all   9.  Suicidal or self-harm thoughts Not at all   PHQ-9 via [facility] TOTAL SCORE-----> 2 (Minimal depression)   Difficulty at work, home, or with people Not difficult at all       Data saved with a previous flowsheet row definition       Developed by Maura Valdez, Marifer Mcclelland, Sami Nayak and colleagues, with an educational franchesca from [employer]. No permission required to reproduce, translate, display or distribute.        Allergies:    Allergies   Allergen Reactions    No Known Drug Allergy        Medications:    Current Outpatient Medications   Medication Sig Dispense Refill    levothyroxine (SYNTHROID/LEVOTHROID) 50 MCG tablet Take 1 tablet (50 mcg) by mouth every morning (before breakfast). 90 tablet 0    nystatin (MYCOSTATIN) 142876 UNIT/GM external cream Apply topically 2 times daily. 30 g 2       Problem List:  Patient Active Problem List    Diagnosis Date Noted    Hypothyroidism due to acquired atrophy of thyroid 05/26/2025     Priority: Medium    Acne 08/19/2010     Priority: Medium        Past Medical/Surgical History:  Past Medical History:   Diagnosis Date    Closed displaced transverse fracture of shaft of left femur (H) 12/14/2019    Added automatically from request for surgery [id number]    Other acute pulmonary embolism without acute cor pulmonale (H)  12/20/2019    Other pulmonary embolism without acute cor pulmonale (H)     related to femur fracture    Snowboarding accident     femur fracture     Past Surgical History:   Procedure Laterality Date    ORTHOPEDIC SURGERY  12/14/19    Broke Femur, insert duy and pins.       Social History:  Social History     Socioeconomic History    Marital status:      Spouse name: Not on file    Number of children: Not on file    Years of education: Not on file    Highest education level: Not on file   Occupational History    Not on file   Tobacco Use    Smoking status: Never     Passive exposure: Yes    Smokeless tobacco: Never    Tobacco comments:     parents   Vaping Use    Vaping status: Never Used   Substance and Sexual Activity    Alcohol use: Yes     Alcohol/week: 5.0 standard drinks of alcohol     Comment: 1-2 drinks a month    Drug use: No    Sexual activity: Yes     Partners: Female     Birth control/protection: Pull-out method, Condom, Inserts/Ring   Other Topics Concern    Parent/sibling w/ CABG, MI or angioplasty before 65F 55M? Yes   Social History Narrative    Not on file     Social Drivers of Health     Financial Resource Strain: Low Risk  (3/25/2025)    Financial Resource Strain     Within the past 12 months, have you or your family members you live with been unable to get utilities (heat, electricity) when it was really needed?: No   Food Insecurity: Low Risk  (3/25/2025)    Food Insecurity     Within the past 12 months, did you worry that your food would run out before you got money to buy more?: No     Within the past 12 months, did the food you bought just not last and you didn t have money to get more?: No   Transportation Needs: Low Risk  (3/25/2025)    Transportation Needs     Within the past 12 months, has lack of transportation kept you from medical appointments, getting your medicines, non-medical meetings or appointments, work, or from getting things that you need?: No   Physical Activity:  Sufficiently Active (3/25/2025)    Exercise Vital Sign     Days of Exercise per Week: 6 days     Minutes of Exercise per Session: 60 min   Stress: Stress Concern Present (3/25/2025)    [place] [employer] - Occupational Stress Questionnaire     Feeling of Stress : Rather much   Social Connections: Unknown (3/25/2025)    Social Connection and Isolation Panel [NHANES]     Frequency of Communication with Friends and Family: Not on file     Frequency of Social Gatherings with Friends and Family: Once a week     Attends Taoism Services: Not on file     Active Member of Clubs or Organizations: Not on file     Attends Club or Organization Meetings: Not on file     Marital Status: Not on file   Interpersonal Safety: Low Risk  (3/26/2025)    Interpersonal Safety     Do you feel physically and emotionally safe where you currently live?: Yes     Within the past 12 months, have you been hit, slapped, kicked or otherwise physically hurt by someone?: No     Within the past 12 months, have you been humiliated or emotionally abused in other ways by your partner or ex-partner?: No   Housing Stability: Low Risk  (3/25/2025)    Housing Stability     Do you have housing? : Yes     Are you worried about losing your housing?: No       Family History:  Family History   Problem Relation Age of Onset    Diabetes Father     Hypertension Father     Heart Disease Father     Diabetes Sister 35    Depression Sister     Anxiety Disorder Sister     Coronary Artery Disease Brother     Breast Cancer Maternal Grandmother     Lipids Maternal Grandmother     Thyroid Disease Paternal Grandmother        Review of Systems:  A complete review of systems reviewed by me is negative with the exeption of what has been mentioned in the history of present illness.  In the last TWO WEEKS have you experienced any of the following symptoms?  Fevers: (Patient-Rptd) No  Night Sweats: (Patient-Rptd) No  Weight Gain: (Patient-Rptd) No  Pain at  "Night: (Patient-Rptd) Yes  Double Vision: (Patient-Rptd) No  Changes in Vision: (Patient-Rptd) No  Difficulty Breathing through Nose: (Patient-Rptd) No  Sore Throat in Morning: (Patient-Rptd) Yes  Dry Mouth in the Morning: (Patient-Rptd) Yes  Shortness of Breath Lying Flat: (Patient-Rptd) No  Shortness of Breath With Activity: (Patient-Rptd) No  Awakening with Shortness of Breath: (Patient-Rptd) No  Increased Cough: (Patient-Rptd) No  Heart Racing at Night: (Patient-Rptd) No  Swelling in Feet or Legs: (Patient-Rptd) No  Diarrhea at Night: (Patient-Rptd) No  Heartburn at Night: (Patient-Rptd) No  Urinating More than Once at Night: (Patient-Rptd) No  Losing Control of Urine at Night: (Patient-Rptd) No  Joint Pains at Night: (Patient-Rptd) Yes  Headaches in Morning: (Patient-Rptd) No  Weakness in Arms or Legs: (Patient-Rptd) No  Depressed Mood: (Patient-Rptd) No  Anxiety: (Patient-Rptd) No     Physical Examination:  Vitals: /75   Pulse 54   Resp 15   Ht 1.854 m (6' 1\")   Wt 88.3 kg (194 lb 11.2 oz)   SpO2 99%   BMI 25.69 kg/m    BMI= Body mass index is 25.69 kg/m .         Physical Exam  Vitals and nursing note reviewed.   Constitutional:       General: He is awake. He is not in acute distress.     Appearance: Normal appearance. He is well-developed, well-groomed and normal weight. He is not ill-appearing, toxic-appearing or diaphoretic.   HENT:      Head: Normocephalic and atraumatic.      Right Ear: External ear normal.      Left Ear: External ear normal.      Nose: Nose normal.      Mouth/Throat:      Mouth: Mucous membranes are moist.      Pharynx: Oropharynx is clear.   Eyes:      Conjunctiva/sclera: Conjunctivae normal.   Pulmonary:      Effort: Pulmonary effort is normal.   Musculoskeletal:         General: Normal range of motion.      Cervical back: Normal range of motion and neck supple.   Skin:     General: Skin is warm and dry.      Capillary Refill: Capillary refill takes less than 2 seconds. " "  Neurological:      General: No focal deficit present.      Mental Status: He is alert and oriented to person, place, and time.   Psychiatric:         Mood and Affect: Mood normal.         Behavior: Behavior normal. Behavior is cooperative.         Thought Content: Thought content normal.         Judgment: Judgment normal.            Mallampati Class: III.  Tonsillar Stage: 2  visible at pillars.    All Labs Personally Reviewed             Data: All pertinent previous laboratory data reviewed     Recent Labs   Lab Test 10/14/24  0547 08/18/21  1051    138   POTASSIUM 4.2 4.4   CHLORIDE 98 107   CO2 25 28   ANIONGAP 12 3   * 103*   BUN 14.2 14   CR 0.90 0.92   CRISTIANE 9.0 9.0       Recent Labs   Lab Test 10/14/24  0547   WBC 6.6   RBC 5.08   HGB 14.7   HCT 42.8   MCV 84   MCH 28.9   MCHC 34.3   RDW 12.5          Recent Labs   Lab Test 10/14/24  0547   PROTTOTAL 7.4   ALBUMIN 3.8   BILITOTAL 0.3   ALKPHOS 73   AST 31   ALT 33       TSH   Date Value   05/24/2025 9.78 uIU/mL (H)   03/26/2025 4.88 uIU/mL (H)   01/04/2018 1.63 mU/L       No results found for: \"UAMP\", \"UBARB\", \"BENZODIAZEUR\", \"UCANN\", \"UCOC\", \"OPIT\", \"UPCP\"    No results found for: \"IRONSAT\", \"[id number]\", \"MICHAEL\"    No results found for: \"PH\", \"PHARTERIAL\", \"PO2\", \"[id number]\", \"SAT\", \"PCO2\", \"HCO3\", \"BASEEXCESS\", \"BERNICE\", \"BEB\"    @LABRCNTIPR(phv:4,pco2v:4,po2v:4,hco3v:4,carrington:4,o2per:4)@    Echocardiology:         Chest x-ray: No results found for this or any previous visit from the past 365 days.      Chest CT:   CT Chest Pulmonary Embolism w Contrast 10/14/2024    Narrative  EXAM: CT CHEST PULMONARY EMBOLISM W CONTRAST  LOCATION: [facility]  DATE: 10/14/2024    INDICATION: elevated dimer  COMPARISON: Chest radiograph performed earlier on 10/14/2024  TECHNIQUE: CT chest pulmonary angiogram during arterial phase injection of IV contrast. Multiplanar reformats and MIP reconstructions were performed. Dose reduction " techniques were used.  CONTRAST: Isovue 370,  64ml    FINDINGS:  ANGIOGRAM CHEST: Pulmonary arteries are normal caliber and negative for pulmonary emboli. Thoracic aorta is negative for dissection. No CT evidence of right heart strain.    LUNGS AND PLEURA: Airway thickening to the right lower lobe where there is a large area of consolidation with surrounding patchy groundglass opacities. Additional patchy groundglass opacities in the right middle lobe. The left upper and left lower lobes  are clear. No pneumothorax or pleural effusion.    MEDIASTINUM/AXILLAE: Mildly enlarged mediastinal and right hilar lymph node should be reactive.    CORONARY ARTERY CALCIFICATION: None.    UPPER ABDOMEN: Mild splenomegaly (13.5 cm). Focal steatosis near the fissure for the falciform ligament.    MUSCULOSKELETAL: Unremarkable.    Impression  IMPRESSION:  1.  No pulmonary emboli.  2.  Findings suspicious for pneumonia involving the right middle and right lower lobes with a large area of consolidation in the right lower lobe.  3.  Mildly enlarged mediastinal and right hilar lymph nodes should be reactive.  4.  Mild splenomegaly.      PFT: Most Recent [place] Pulmonary Function Testing        KWAME Salas CNP 6/24/2025   Sleep Medicine

## 2025-06-25 ENCOUNTER — OFFICE VISIT (OUTPATIENT)
Dept: SLEEP MEDICINE | Facility: CLINIC | Age: 35
End: 2025-06-25
Attending: PHYSICIAN ASSISTANT
Payer: COMMERCIAL

## 2025-06-25 VITALS
BODY MASS INDEX: 25.8 KG/M2 | SYSTOLIC BLOOD PRESSURE: 127 MMHG | WEIGHT: 194.7 LBS | RESPIRATION RATE: 15 BRPM | HEIGHT: 73 IN | OXYGEN SATURATION: 99 % | DIASTOLIC BLOOD PRESSURE: 75 MMHG | HEART RATE: 54 BPM

## 2025-06-25 DIAGNOSIS — R06.83 SNORING: ICD-10-CM

## 2025-06-25 DIAGNOSIS — Z86.711 HISTORY OF PULMONARY EMBOLISM: ICD-10-CM

## 2025-06-25 DIAGNOSIS — R06.81 APNEA: ICD-10-CM

## 2025-06-25 DIAGNOSIS — R29.818 SUSPECTED SLEEP APNEA: Primary | ICD-10-CM

## 2025-06-25 DIAGNOSIS — R53.82 CHRONIC FATIGUE: ICD-10-CM

## 2025-06-25 NOTE — PATIENT INSTRUCTIONS
"          MY TREATMENT INFORMATION FOR SLEEP APNEA-  Burton Pillai    DOCTOR : KWAME Salas CNP    Am I having a sleep study at a sleep center?  --->Due to normal delays, you will be contacted within 2-4 weeks to schedule    Am I having a home sleep study?  --->Watch the video for the device you are using:    -/drop off device-   https://www.[facility].com/watch?v=yGGFBdELGhk    -Disposable device sent out require phone/computer application-   https://www.[facility].com/watch?v=BCce_vbiwxE      Frequently asked questions:  1. What is Obstructive Sleep Apnea (INOCENTE)? INOCENTE is the most common type of sleep apnea. Apnea means, \"without breath.\"  Apnea is most often caused by narrowing or collapse of the upper airway as muscles relax during sleep.   Almost everyone has occasional apneas. Most people with sleep apnea have had brief interruptions at night frequently for many years.  The severity of sleep apnea is related to how frequent and severe the events are.   2. What are the consequences of INOCENTE? Symptoms include: feeling sleepy during the day, snoring loudly, gasping or stopping of breathing, trouble sleeping, and occasionally morning headaches or heartburn at night.  Sleepiness can be serious and even increase the risk of falling asleep while driving. Other health consequences may include development of high blood pressure and other cardiovascular disease in persons who are susceptible. Untreated INOCENTE  can contribute to heart disease, stroke and diabetes.   3. What are the treatment options? In most situations, sleep apnea is a lifelong disease that must be managed with daily therapy. Medications are not effective for sleep apnea and surgery is generally not considered until other therapies have been tried. Your treatment is your choice . Continuous Positive Airway (CPAP) works right away and is the therapy that is effective in nearly everyone. An oral device to hold your jaw forward is usually the " next most reliable option. Other options include postioning devices (to keep you off your back), weight loss, and surgery including a tongue pacing device. There is more detail about some of these options below.  4. Are my sleep studies covered by insurance? Although we will request verification of coverage, we advise you also check in advance of the study to ensure there is coverage.    Important tips for those choosing CPAP and similar devices  REMEMBER-IF YOU RECEIVE A CALL FROM  [phone]-->IT IS TO SETUP A DEVICE  For new devices, sign up for device ADAL to monitor your device for your followup visits  We encourage you to utilize the valuklik adal or website ( [email or web address]/ ) to monitor your therapy progress and share the data with your healthcare team when you discuss your sleep apnea.                                                    Know your equipment:  CPAP is continuous positive airway pressure that prevents obstructive sleep apnea by keeping the throat from collapsing while you are sleeping. In most cases, the device is  smart  and can slowly self-adjusts if your throat collapses and keeps a record every day of how well you are treated-this information is available to you and your care team.  BPAP is bilevel positive airway pressure that keeps your throat open and also assists each breath with a pressure boost to maintain adequate breathing.  Special kinds of BPAP are used in patients who have inadequate breathing from lung or heart disease. In most cases, the device is  smart  and can slowly self-adjusts to assist breathing. Like CPAP, the device keeps a record of how well you are treated.  Your mask is your connection to the device. You get to choose what feels most comfortable and the staff will help to make sure if fits. Here: are some examples of the different masks that are available: Magnetic mask aids may assist with use but there are safety issues that should be addressed  when considering with magnets* ( see end of discussion).       Key points to remember on your journey with sleep apnea:  Sleep study.  PAP devices often need to be adjusted during a sleep study to show that they are effective and adjusted right.  Good tips to remember: Try wearing just the mask during a quiet time during the day so your body adapts to wearing it. A humidifier is recommended for comfort in most cases to prevent drying of your nose and throat. Allergy medication from your provider may help you if you are having nasal congestion.  Getting settled-in. It takes more than one night for most of us to get used to wearing a mask. Try wearing just the mask during a quiet time during the day so your body adapts to wearing it. A humidifier is recommended for comfort in most cases. Our team will work with you carefully on the first day and will be in contact within 4 days and again at 2 and 4 weeks for advice and remote device adjustments. Your therapy is evaluated by the device each day.   Use it every night. The more you are able to sleep naturally for 7-8 hours, the more likely you will have good sleep and to prevent health risks or symptoms from sleep apnea. Even if you use it 4 hours it helps. Occasionally all of us are unable to use a medical therapy, in sleep apnea, it is not dangerous to miss one night.   Communicate. Call our skilled team on the number provided on the first day if your visit for problems that make it difficult to wear the device. Over 2 out of 3 patients can learn to wear the device long-term with help from our team. Remember to call our team or your sleep providers if you are unable to wear the device as we may have other solutions for those who cannot adapt to mask CPAP therapy. It is recommended that you sleep your sleep provider within the first 3 months and yearly after that if you are not having problems.   Use it for your health. We encourage use of CPAP masks during daytime  quiet periods to allow your face and brain to adapt to the sensation of CPAP so that it will be a more natural sensation to awaken to at night or during naps. This can be very useful during the first few weeks or months of adapting to CPAP though it does not help medically to wear CPAP during wakefulness and  should not be used as a strategy just to meet guidelines.  Take care of your equipment. Make sure you clean your mask and tubing using directions every day and that your filter and mask are replaced as recommended or if they are not working.     *Masks with magnets:  Updated Contraindications  Masks with magnetic components are contraindicated for use by patients where they, or anyone in close physical contact while using the mask, have the following:   Active medical implants that interact with magnets (i.e., pacemakers, implantable cardioverter defibrillators (ICD), neurostimulators, cerebrospinal fluid (CSF) shunts, insulin/infusion pumps)   Metallic implants/objects containing ferromagnetic material (i.e., aneurysm clips/flow disruption devices, embolic coils, stents, valves, electrodes, implants to restore hearing or balance with implanted magnets, ocular implants, metallic splinters in the eye)  Updated Warning  Keep the mask magnets at a safe distance of at least 6 inches (150 mm) away from implants or medical devices that may be adversely affected by magnetic interference. This warning applies to you or anyone in close physical contact with your mask. The magnets are in the frame and lower headgear clips, with a magnetic field strength of up to 400mT. When worn, they connect to secure the mask but may inadvertently detach while asleep.  Implants/medical devices, including those listed within contraindications, may be adversely affected if they change function under external magnetic fields or contain ferromagnetic materials that attract/repel to magnetic fields (some metallic implants, e.g., contact  lenses with metal, dental implants, metallic cranial plates, screws, ronni hole covers, and bone substitute devices). Consult your physician and  of your implant / other medical device for information on the potential adverse effects of magnetic fields.    BESIDES CPAP, WHAT OTHER THERAPIES ARE THERE?    Positioning Device  Positioning devices are generally used when sleep apnea is mild and only occurs on your back.This example shows a pillow that straps around the waist. It may be appropriate for those whose sleep study shows milder sleep apnea that occurs primarily when lying flat on one's back. Preliminary studies have shown benefit but effectiveness at home may need to be verified by a home sleep test. These devices are generally not covered by medical insurance.  Examples of devices that maintain sleeping on the back to prevent snoring and mild sleep apnea.    Belt type body positioner  http://[employer]/    Electronic reminder  http://nightshifttherapy.com/            Oral Appliance  What is oral appliance therapy?  An oral appliance device fits on your teeth at night like a retainer used after having braces. The device is made by a specialized dentist and requires several visits over 1-2 months before a manufactured device is made to fit your teeth and is adjusted to prevent your sleep apnea. Once an oral device is working properly, snoring should be improved. A home sleep test may be recommended at that time if to determine whether the sleep apnea is adequately treated.       Some things to remember:  -Oral devices are often, but not always, covered by your medical insurance. Be sure to check with your insurance provider.   -If you are referred for oral therapy, you will be given a list of specialized dentists to consider or you may choose to visit the Web site of the American Academy of Dental Sleep Medicine  -Oral devices are less likely to work if you have severe sleep apnea or are extremely  overweight.     More detailed information  An oral appliance is a small acrylic device that fits over the upper and lower teeth  (similar to a retainer or a mouth guard). This device slightly moves jaw forward, which moves the base of the tongue forward, opens the airway, improves breathing for effective treat snoring and obstructive sleep apnea in perhaps 7 out of 10 people .  The best working devices are custom-made by a dental device  after a mold is made of the teeth 1, 2, 3.  When is an oral appliance indicated?  Oral appliance therapy is recommended as a first-line treatment for patients with primary snoring, mild sleep apnea, and for patients with moderate sleep apnea who prefer appliance therapy to use of CPAP4, 5. Severity of sleep apnea is determined by sleep testing and is based on the number of respiratory events per hour of sleep.   How successful is oral appliance therapy?  The success rate of oral appliance therapy in patients with mild sleep apnea is 75-80% while in patients with moderate sleep apnea it is 50-70%. The chance of success in patients with severe sleep apnea is 40-50%. The research also shows that oral appliances have a beneficial effect on the cardiovascular health of INOCENTE patients at the same magnitude as CPAP therapy7.  Oral appliances should be a second-line treatment in cases of severe sleep apnea, but if not completely successful then a combination therapy utilizing CPAP plus oral appliance therapy may be effective. Oral appliances tend to be effective in a broad range of patients although studies show that the patients who have the highest success are females, younger patients, those with milder disease, and less severe obesity. 3, 6.   Finding a dentist that practices dental sleep medicine  Specific training is available through the American Academy of Dental Sleep Medicine for dentists interested in working in the field of sleep. To find a dentist who is educated in  the field of sleep and the use of oral appliances, near you, visit the Web site of the American Academy of Dental Sleep Medicine.    References  1. Maximiliano, et al. Objectively measured vs self-reported compliance during oral appliance therapy for sleep-disordered breathing. Chest 2013; 144(5): 6546-4250.  2. Ani et al. Objective measurement of compliance during oral appliance therapy for sleep-disordered breathing. Thorax 2013; 68(1): 91-96.  3. Trevon et al. Mandibular advancement devices in 620 men and women with INOCENTE and snoring: tolerability and predictors of treatment success. Chest 2004; 125: 6174-3169.  4. Maciel, et al. Oral appliances for snoring and INOCENTE: a review. Sleep 2006; 29: 244-262.  5. Maribel et al. Oral appliance treatment for INOCENTE: an update. J Clin Sleep Med 2014; 10(2): 215-227.  6. Yunior et al. Predictors of OSAH treatment outcome. J Dent Res 2007; 86: 3680-1612.      Weight Loss:   Your Body mass index is 25.69 kg/m .    Being overweight does not necessarily mean you will have health consequences.  Those who have BMI over 30 or over 27 with existing medical conditions carries greater risk.   Weight loss decreases severity of sleep apnea in most people with obesity. For those with mild obesity who have developed snoring with weight gain, even 15-30 pound weight loss can improve and occasionally milder eliminate sleep apnea.  Structured and life-long dietary and health habits are necessary to lose weight and keep healthier weight levels.     The Comprehensive Weight loss program offers all aspects of weight loss strategies including two Non-Surgical Weight Loss Programs: Medical Weight Management and our 24 Week Healthy Lifestyle Program:  Medical Weight Management: You will meet with a Medical Weight Management Provider, as well as a Registered Dietician. The program may include medication therapy, dietary education, recommended exercise and physical therapy programs,  monthly support group meetings, and possible psychological counseling. Follow up visits with the provider or dietician are scheduled based on your progress and needs.  24 Week Healthy Lifestyle Program: This unique program is designed to give you the support of weekly appointments and activities thru a 24-week period. It may include all of the components of the basic program (above), with the addition of 11 individual Health  Visits, 24-week access to the [employer] website for over 700 online classes, and monthly support group meetings. This program has an out-of-pocket expense of $499 to cover the items that can not be billed to insurance (health coaches and [employer] access), and is non-refundable/non-transferable (you may be able to use a Health Savings Account; ask your HSA provider). There may be an optional meal replacement plan prescribed as well.   Medication therapy has been approved for the treatment of sleep apnea: The FDA approved tirzepatide (ZEPBOUND) for moderate to severe sleep apnea (apnea-hypopnea index greater than or equal to 15) in patients with BMI of greater than or equal to 30, or BMI greater than or equal to 27 with at least 1 weight-related condition such as hypertension or dyslipidemia.  Surgical management achieves meaningful long-term weight loss and improvement in health risks in most patients with more severe obesity.      Sleep Apnea Surgery:    Surgery for obstructive sleep apnea is considered generally only when other therapies fail to work. Surgery may be discussed with you if you are having a difficult time tolerating CPAP and or when there is an abnormal structure that requires surgical correction.  Nose and throat surgeries often enlarge the airway to prevent collapse.  Most of these surgeries create pain for 1-2 weeks and up to half of the most common surgeries are not effective throughout life.  You should carefully discuss the benefits and drawbacks to surgery with your  sleep provider and surgeon to determine if it is the best solution for you.   More information  Surgery for INOCENTE is directed at areas that are responsible for narrowing or complete obstruction of the airway during sleep.  There are a wide range of procedures available to enlarge and/or stabilize the airway to prevent blockage of breathing in the three major areas where it can occur: the palate, tongue, and nasal regions.  Successful surgical treatment depends on the accurate identification of the factors responsible for obstructive sleep apnea in each person.  A personalized approach is required because there is no single treatment that works well for everyone.  Because of anatomic variation, consultation with an examination by a sleep surgeon is a critical first step in determining what surgical options are best for each patient.  In some cases, examination during sedation may be recommended in order to guide the selection of procedures.  Patients will be counseled about risks and benefits as well as the typical recovery course after surgery. Surgery is typically not a cure for a person s INOCENTE.  However, surgery will often significantly improve one s INOCENTE severity (termed  success rate ).  Even in the absence of a cure, surgery will decrease the cardiovascular risk associated with OSA7; improve overall quality of life8 (sleepiness, functionality, sleep quality, etc).      Palate Procedures:  Patients with INOCENTE often have narrowing of their airway in the region of their tonsils and uvula.  The goals of palate procedures are to widen the airway in this region as well as to help the tissues resist collapse.  Modern palate procedure techniques focus on tissue conservation and soft tissue rearrangement, rather than tissue removal.  Often the uvula is preserved in this procedure. Residual sleep apnea is common in patient after pharyngoplasty with an average reduction in sleep apnea events of 33%2.      Tongue  Procedures:  ExamWhile patients are awake, the muscles that surround the throat are active and keep this region open for breathing. These muscles relax during sleep, allowing the tongue and other structures to collapse and block breathing.  There are several different tongue procedures available.  Selection of a tongue base procedure depends on characteristics seen on physical exam.  Generally, procedures are aimed at removing bulky tissues in this area or preventing the back of the tongue from falling back during sleep.  Success rates for tongue surgery range from 50-62%3.    Hypoglossal Nerve Stimulation:  Hypoglossal nerve stimulation has recently received approval from the [place] Food and Drug Administration for the treatment of obstructive sleep apnea.  This is based on research showing that the system was safe and effective in treating sleep apnea6.  Results showed that the median AHI score decreased 68%, from 29.3 to 9.0. This therapy uses an implant system that senses breathing patterns and delivers mild stimulation to airway muscles, which keeps the airway open during sleep.  The system consists of three fully implanted components: a small generator (similar in size to a pacemaker), a breathing sensor, and a stimulation lead.  Using a small handheld remote, a patient turns the therapy on before bed and off upon awakening.    Candidates for this device must be greater than 18 years of age, have moderate to severe obstructive sleep apnea with less than 25% central events  (AHI between 15-65), BMI less than 35, have tried CPAP/oral appliance for at least 8 weeks without success, and have appropriate upper airway anatomy (determined by a sleep endoscopy performed by Dr. Maximiliano Rubio or Dr. Elie Damian).     Nasal Procedures:  Nasal obstruction can interfere with nasal breathing during the day and night.  Studies have shown that relief of nasal obstruction can improve the ability of some patients to  tolerate positive airway pressure therapy for obstructive sleep apnea1.  Treatment options include medications such as nasal saline, topical corticosteroid and antihistamine sprays, and oral medications such as antihistamines or decongestants. Non-surgical treatments can include external nasal dilators for selected patients. If these are not successful by themselves, surgery can improve the nasal airway either alone or in combination with these other options.        Combination Procedures:  Combination of surgical procedures and other treatments may be recommended, particularly if patients have more than one area of narrowing or persistent positional disease.  The success rate of combination surgery ranges from 66-80%2,3.    References  Gregory LIEBERMAN. The Role of the Nose in Snoring and Obstructive Sleep Apnoea: An Update.  Eur Arch Otorhinolaryngol. 2011; 268: 1365-73.   Gabriele SM; Kingston JA; Pineda JR; Pallanch JF; Titi MB; Fausto SG; Shamar MUÑOZ. Surgical modifications of the upper airway for obstructive sleep apnea in adults: a systematic review and meta-analysis. SLEEP 2010;33(10):6508-7897. aMryse RAHMAN. Hypopharyngeal surgery in obstructive sleep apnea: an evidence-based medicine review.  Arch Otolaryngol Head Neck Surg. 2006 Feb;132(2):206-13.  Dionte YH1, Saray Y, Esdras SOL. The efficacy of anatomically based multilevel surgery for obstructive sleep apnea. Otolaryngol Head Neck Surg. 2003 Oct;129(4):327-35.  Maryse RAHMAN, Goldberg A. Hypopharyngeal Surgery in Obstructive Sleep Apnea: An Evidence-Based Medicine Review. Arch Otolaryngol Head Neck Surg. 2006 Feb;132(2):206-13.  Khadar VALENTINO et al. Upper-Airway Stimulation for Obstructive Sleep Apnea.  N Engl J Med. 2014 Jan 9;370(2):139-49.  Surya Y et al. Increased Incidence of Cardiovascular Disease in Middle-aged Men with Obstructive Sleep Apnea. Am J Respir Crit Care Med; 2002 166: 159-165  Navya HERNANDEZ et al. Studying Life Effects and Effectiveness of  Palatopharyngoplasty (SLEEP) study: Subjective Outcomes of Isolated Uvulopalatopharyngoplasty. Otolaryngol Head Neck Surg. 2011; 144: 623-631.        WHAT IF I ONLY HAVE SNORING?    Mandibular advancement devices, lateral sleep positioning, long-term weight loss and treatment of nasal allergies have been shown to improve snoring.  Exercising tongue muscles with a game (https://apps.[employer]/us/adal/Dotted Block-reduce-snoring/yi2164299891) or stimulating the tongue during the day with a device (https://doi.org/10.3390/ivk31493013) have improved snoring in some individuals.  https://www.[employer]/  https://www.sleepfoundation.org/best-anti-snoring-mouthpieces-and-mouthguards    Remember to Drive Safe... Drive Alive     Sleep health profoundly affects your health, mood, and your safety.  Thirty three percent of the population (one in three of us) is not getting enough sleep and many have a sleep disorder. Not getting enough sleep or having an untreated / undertreated sleep condition may make us sleepy without even knowing it. In fact, our driving could be dramatically impaired due to our sleep health. As your provider, here are some things I would like you to know about driving:     Here are some warning signs for impairment and dangerous drowsy driving:              -Having been awake more than 16 hours               -Looking tired               -Eyelid drooping              -Head nodding (it could be too late at this point)              -Driving for more than 30 minutes     Some things you could do to make the driving safer if you are experiencing some drowsiness:              -Stop driving and rest              -Call for transportation              -Make sure your sleep disorder is adequately treated     Some things that have been shown NOT to work when experiencing drowsiness while driving:              -Turning on the radio              -Opening windows              -Eating any  distracting  /  entertaining   foods (e.g., sunflower seeds, candy, or any other)              -Talking on the phone      Your decision may not only impact your life, but also the life of others. Please, remember to drive safe for yourself and all of us.

## 2025-06-25 NOTE — NURSING NOTE
"Chief Complaint   Patient presents with    Sleep Problem     Snoring, stop breathing during sleep, dry mouth       Initial /75   Pulse 54   Resp 15   Ht 1.854 m (6' 1\")   Wt 88.3 kg (194 lb 11.2 oz)   SpO2 99%   BMI 25.69 kg/m   Estimated body mass index is 25.69 kg/m  as calculated from the following:    Height as of this encounter: 1.854 m (6' 1\").    Weight as of this encounter: 88.3 kg (194 lb 11.2 oz).    Medication Reconciliation: complete    Neck circumference: 16 inches / 41 centimeters.  ESS: 11  DME: N/A      BLAINE Lugo      "